# Patient Record
Sex: MALE | Race: WHITE | NOT HISPANIC OR LATINO | Employment: FULL TIME | URBAN - NONMETROPOLITAN AREA
[De-identification: names, ages, dates, MRNs, and addresses within clinical notes are randomized per-mention and may not be internally consistent; named-entity substitution may affect disease eponyms.]

---

## 2020-06-25 PROBLEM — I10 HYPERTENSION: Status: ACTIVE | Noted: 2020-06-25

## 2020-06-25 PROBLEM — F32.A DEPRESSIVE DISORDER: Status: ACTIVE | Noted: 2020-06-25

## 2020-06-25 PROBLEM — E66.9 OBESITY: Status: ACTIVE | Noted: 2020-06-25

## 2020-10-22 ENCOUNTER — HISTORICAL (OUTPATIENT)
Dept: ADMINISTRATIVE | Facility: HOSPITAL | Age: 55
End: 2020-10-22

## 2020-10-22 PROBLEM — Z87.442 HISTORY OF KIDNEY STONES: Status: ACTIVE | Noted: 2020-10-22

## 2021-05-17 PROBLEM — I10 ESSENTIAL HYPERTENSION: Chronic | Status: ACTIVE | Noted: 2020-06-25

## 2021-05-17 PROBLEM — R73.03 PREDIABETES: Status: ACTIVE | Noted: 2021-05-17

## 2021-05-17 PROBLEM — R73.03 PREDIABETES: Chronic | Status: ACTIVE | Noted: 2021-05-17

## 2021-10-04 ENCOUNTER — HISTORICAL (OUTPATIENT)
Dept: ADMINISTRATIVE | Facility: HOSPITAL | Age: 56
End: 2021-10-04

## 2022-04-10 ENCOUNTER — HISTORICAL (OUTPATIENT)
Dept: ADMINISTRATIVE | Facility: HOSPITAL | Age: 57
End: 2022-04-10

## 2022-04-24 VITALS
WEIGHT: 226.63 LBS | BODY MASS INDEX: 35.57 KG/M2 | SYSTOLIC BLOOD PRESSURE: 132 MMHG | DIASTOLIC BLOOD PRESSURE: 78 MMHG | HEIGHT: 67 IN

## 2023-07-14 ENCOUNTER — OFFICE VISIT (OUTPATIENT)
Dept: FAMILY MEDICINE | Facility: CLINIC | Age: 58
End: 2023-07-14
Payer: COMMERCIAL

## 2023-07-14 VITALS
WEIGHT: 237.19 LBS | HEART RATE: 100 BPM | BODY MASS INDEX: 37.23 KG/M2 | HEIGHT: 67 IN | SYSTOLIC BLOOD PRESSURE: 140 MMHG | OXYGEN SATURATION: 99 % | DIASTOLIC BLOOD PRESSURE: 98 MMHG | TEMPERATURE: 98 F

## 2023-07-14 DIAGNOSIS — M25.50 PAIN IN JOINT, MULTIPLE SITES: ICD-10-CM

## 2023-07-14 DIAGNOSIS — I10 ESSENTIAL HYPERTENSION: ICD-10-CM

## 2023-07-14 DIAGNOSIS — R12 HEARTBURN: ICD-10-CM

## 2023-07-14 DIAGNOSIS — F41.9 ANXIETY: ICD-10-CM

## 2023-07-14 DIAGNOSIS — R73.03 PREDIABETES: Chronic | ICD-10-CM

## 2023-07-14 DIAGNOSIS — R79.89 ELEVATED SERUM CREATININE: ICD-10-CM

## 2023-07-14 DIAGNOSIS — N18.31 STAGE 3A CHRONIC KIDNEY DISEASE: ICD-10-CM

## 2023-07-14 DIAGNOSIS — F32.A DEPRESSIVE DISORDER: ICD-10-CM

## 2023-07-14 DIAGNOSIS — Z00.01 ABNORMAL WELLNESS EXAM: Primary | ICD-10-CM

## 2023-07-14 DIAGNOSIS — E66.01 CLASS 2 SEVERE OBESITY DUE TO EXCESS CALORIES WITH SERIOUS COMORBIDITY AND BODY MASS INDEX (BMI) OF 37.0 TO 37.9 IN ADULT: ICD-10-CM

## 2023-07-14 DIAGNOSIS — Z13.31 POSITIVE DEPRESSION SCREENING: ICD-10-CM

## 2023-07-14 LAB
ALBUMIN SERPL-MCNC: 4.4 G/DL (ref 3.4–5)
ALBUMIN/GLOB SERPL: 1.8 RATIO
ALP SERPL-CCNC: 107 UNIT/L (ref 50–144)
ALT SERPL-CCNC: 64 UNIT/L (ref 1–45)
ANION GAP SERPL CALC-SCNC: 3 MEQ/L (ref 2–13)
AST SERPL-CCNC: 52 UNIT/L (ref 17–59)
BASOPHILS # BLD AUTO: 0.06 X10(3)/MCL (ref 0.01–0.08)
BASOPHILS NFR BLD AUTO: 1 % (ref 0.1–1.2)
BILIRUBIN DIRECT+TOT PNL SERPL-MCNC: 0.6 MG/DL (ref 0–1)
BUN SERPL-MCNC: 18 MG/DL (ref 7–20)
CALCIUM SERPL-MCNC: 9.7 MG/DL (ref 8.4–10.2)
CHLORIDE SERPL-SCNC: 103 MMOL/L (ref 98–110)
CHOLEST SERPL-MCNC: 239 MG/DL (ref 0–200)
CO2 SERPL-SCNC: 33 MMOL/L (ref 21–32)
CREAT SERPL-MCNC: 1.04 MG/DL (ref 0.66–1.25)
CREAT/UREA NIT SERPL: 17 (ref 12–20)
EOSINOPHIL # BLD AUTO: 0.18 X10(3)/MCL (ref 0.04–0.54)
EOSINOPHIL NFR BLD AUTO: 3 % (ref 0.7–7)
ERYTHROCYTE [DISTWIDTH] IN BLOOD BY AUTOMATED COUNT: 13 %
EST. AVERAGE GLUCOSE BLD GHB EST-MCNC: 116.9 MG/DL (ref 70–115)
GFR SERPLBLD CREATININE-BSD FMLA CKD-EPI: 84 MLS/MIN/1.73/M2
GLOBULIN SER-MCNC: 2.5 GM/DL (ref 2–3.9)
GLUCOSE SERPL-MCNC: 98 MG/DL (ref 70–115)
HBA1C MFR BLD: 5.7 % (ref 4–6)
HCT VFR BLD AUTO: 47.2 % (ref 36–52)
HDLC SERPL-MCNC: 41 MG/DL (ref 40–60)
HGB BLD-MCNC: 15.7 G/DL (ref 13–18)
IMM GRANULOCYTES # BLD AUTO: 0.01 X10(3)/MCL (ref 0–0.03)
IMM GRANULOCYTES NFR BLD AUTO: 0.2 % (ref 0–0.5)
LDLC SERPL DIRECT ASSAY-SCNC: 152.6 MG/DL (ref 30–100)
LYMPHOCYTES # BLD AUTO: 2.47 X10(3)/MCL (ref 1.32–3.57)
LYMPHOCYTES NFR BLD AUTO: 41.3 % (ref 20–55)
MCH RBC QN AUTO: 31.4 PG (ref 27–34)
MCHC RBC AUTO-ENTMCNC: 33.3 G/DL (ref 31–37)
MCV RBC AUTO: 94.4 FL (ref 79–99)
MONOCYTES # BLD AUTO: 0.8 X10(3)/MCL (ref 0.3–0.82)
MONOCYTES NFR BLD AUTO: 13.4 % (ref 4.7–12.5)
NEUTROPHILS # BLD AUTO: 2.46 X10(3)/MCL (ref 1.78–5.38)
NEUTROPHILS NFR BLD AUTO: 41.1 % (ref 37–73)
NRBC BLD AUTO-RTO: 0 %
PLATELET # BLD AUTO: 291 X10(3)/MCL (ref 140–371)
PMV BLD AUTO: 10 FL (ref 9.4–12.4)
POTASSIUM SERPL-SCNC: 4.2 MMOL/L (ref 3.5–5.1)
PROT SERPL-MCNC: 6.9 GM/DL (ref 6.3–8.2)
PSA SERPL-MCNC: 0.68 NG/ML
RBC # BLD AUTO: 5 X10(6)/MCL (ref 4–6)
SODIUM SERPL-SCNC: 139 MMOL/L (ref 135–145)
TRIGL SERPL-MCNC: 227 MG/DL (ref 30–200)
TSH SERPL-ACNC: 3.72 UIU/ML (ref 0.36–3.74)
WBC # SPEC AUTO: 5.98 X10(3)/MCL (ref 4–11.5)

## 2023-07-14 PROCEDURE — 83036 HEMOGLOBIN GLYCOSYLATED A1C: CPT | Performed by: NURSE PRACTITIONER

## 2023-07-14 PROCEDURE — 3080F DIAST BP >= 90 MM HG: CPT | Mod: CPTII,,, | Performed by: NURSE PRACTITIONER

## 2023-07-14 PROCEDURE — 99386 PREV VISIT NEW AGE 40-64: CPT | Mod: ,,, | Performed by: NURSE PRACTITIONER

## 2023-07-14 PROCEDURE — 1159F MED LIST DOCD IN RCRD: CPT | Mod: CPTII,,, | Performed by: NURSE PRACTITIONER

## 2023-07-14 PROCEDURE — 84443 ASSAY THYROID STIM HORMONE: CPT | Performed by: NURSE PRACTITIONER

## 2023-07-14 PROCEDURE — 85025 COMPLETE CBC W/AUTO DIFF WBC: CPT | Performed by: NURSE PRACTITIONER

## 2023-07-14 PROCEDURE — 1160F PR REVIEW ALL MEDS BY PRESCRIBER/CLIN PHARMACIST DOCUMENTED: ICD-10-PCS | Mod: CPTII,,, | Performed by: NURSE PRACTITIONER

## 2023-07-14 PROCEDURE — 99386 PR PREVENTIVE VISIT,NEW,40-64: ICD-10-PCS | Mod: ,,, | Performed by: NURSE PRACTITIONER

## 2023-07-14 PROCEDURE — 3008F BODY MASS INDEX DOCD: CPT | Mod: CPTII,,, | Performed by: NURSE PRACTITIONER

## 2023-07-14 PROCEDURE — 3008F PR BODY MASS INDEX (BMI) DOCUMENTED: ICD-10-PCS | Mod: CPTII,,, | Performed by: NURSE PRACTITIONER

## 2023-07-14 PROCEDURE — 84153 ASSAY OF PSA TOTAL: CPT | Performed by: NURSE PRACTITIONER

## 2023-07-14 PROCEDURE — 80061 LIPID PANEL: CPT | Performed by: NURSE PRACTITIONER

## 2023-07-14 PROCEDURE — 1159F PR MEDICATION LIST DOCUMENTED IN MEDICAL RECORD: ICD-10-PCS | Mod: CPTII,,, | Performed by: NURSE PRACTITIONER

## 2023-07-14 PROCEDURE — 1160F RVW MEDS BY RX/DR IN RCRD: CPT | Mod: CPTII,,, | Performed by: NURSE PRACTITIONER

## 2023-07-14 PROCEDURE — 3080F PR MOST RECENT DIASTOLIC BLOOD PRESSURE >= 90 MM HG: ICD-10-PCS | Mod: CPTII,,, | Performed by: NURSE PRACTITIONER

## 2023-07-14 PROCEDURE — 80053 COMPREHEN METABOLIC PANEL: CPT | Performed by: NURSE PRACTITIONER

## 2023-07-14 PROCEDURE — 3077F PR MOST RECENT SYSTOLIC BLOOD PRESSURE >= 140 MM HG: ICD-10-PCS | Mod: CPTII,,, | Performed by: NURSE PRACTITIONER

## 2023-07-14 PROCEDURE — 3077F SYST BP >= 140 MM HG: CPT | Mod: CPTII,,, | Performed by: NURSE PRACTITIONER

## 2023-07-14 RX ORDER — VENLAFAXINE HYDROCHLORIDE 37.5 MG/1
37.5 CAPSULE, EXTENDED RELEASE ORAL DAILY
Qty: 90 CAPSULE | Refills: 1 | Status: SHIPPED | OUTPATIENT
Start: 2023-07-14 | End: 2024-01-08 | Stop reason: SDUPTHER

## 2023-07-14 RX ORDER — OLMESARTAN MEDOXOMIL AND HYDROCHLOROTHIAZIDE 20/12.5 20; 12.5 MG/1; MG/1
1 TABLET ORAL DAILY
Qty: 90 TABLET | Refills: 3 | Status: SHIPPED | OUTPATIENT
Start: 2023-07-14 | End: 2023-08-10

## 2023-07-14 RX ORDER — PANTOPRAZOLE SODIUM 20 MG/1
TABLET, DELAYED RELEASE ORAL
Qty: 90 TABLET | Refills: 1 | Status: SHIPPED | OUTPATIENT
Start: 2023-07-14 | End: 2024-03-28

## 2023-07-14 RX ORDER — BUSPIRONE HYDROCHLORIDE 5 MG/1
5 TABLET ORAL 2 TIMES DAILY
Qty: 180 TABLET | Refills: 1 | Status: SHIPPED | OUTPATIENT
Start: 2023-07-14 | End: 2024-01-08

## 2023-07-14 RX ORDER — METFORMIN HYDROCHLORIDE 500 MG/1
500 TABLET, EXTENDED RELEASE ORAL DAILY
Qty: 90 TABLET | Refills: 3 | Status: SHIPPED | OUTPATIENT
Start: 2023-07-14

## 2023-07-14 NOTE — PROGRESS NOTES
I have reviewed the positive depression score which warrants active treatment with psychotherapy and/or medications. ***

## 2023-07-14 NOTE — ASSESSMENT & PLAN NOTE
Lab Results   Component Value Date    BUN 39 (H) 12/13/2022    CREATININE 1.57 (H) 12/13/2022    EGFRNORACEVR 51 (L) 12/13/2022    K 4.2 12/13/2022

## 2023-07-14 NOTE — ASSESSMENT & PLAN NOTE
Encouraged relaxation techniques such as yoga and deep breathing; daily exercise; and avoiding caffeine, alcohol and stimulants. Educated patient on the risks of serotonin based medications. Common side effects include nausea, GI upset, headache, dizziness. Educated on serotonin syndrome and on the need to call office or seek ER treatment if develops suicidal ideation and plan. Advised patient that benefits of the medication may not be noticeable for up to 6 weeks from start date. Verbal No self harm agreement made with patient. Advised to call or seek er treatment if develop suicidal plan. Patient states understanding.

## 2023-07-14 NOTE — ASSESSMENT & PLAN NOTE
This is a chronic and a comorbid condition that affected my decision making today. Patient educated on importance of diet and exercise.  Weight loss goals discussed. Recommended 30-45 minutes of exercise five days a week.  In addition, counseled patient on importance of low fat diet, limiting carbohydrate intake, and increasing protein and vegetable intake. Hand outs provided. All questions answered.

## 2023-07-14 NOTE — ASSESSMENT & PLAN NOTE
Lab Results   Component Value Date    BUN 39 (H) 12/13/2022    CREATININE 1.57 (H) 12/13/2022    EGFRNORACEVR 51 (L) 12/13/2022    K 4.2 12/13/2022     Was referred to nephrology but switched jobs, repeat labs drawn in office.

## 2023-07-14 NOTE — PROGRESS NOTES
Patient ID: Isaac Rodriguez  : 1965    Chief Complaint: Depression, Hypertension, and Chronic Kidney Disease    Allergies: Patient has No Known Allergies.     History of Present Illness:  The patient is a 57 y.o. White male who presents to clinic for annual wellness visit.    Diet and nutrition:  Diet is high in salt, high in fat, low in fiber, high caloric intake, high carbohydrate meals, high calcium intake.    Fracture risk: No history of fracture, no recent unexplained fracture.    Physical activity:  Does not exercise on a regular basis, good physical condition.    Depression risks:  + history of depression, feels sad, empty, or tearful, no sleep disturbances, no agitation,  loss of energy, no feelings of worthlessness or guilt, no thoughts of suicide.    Orientation:  No disorientation to time, no disorientation to place.    Concentration and memory:  No decreased concentration ability, no memory lapses or loss, does not forget words.    Speech forward/motor difficulties: No speech difficulties, no difficulty expressing formulated concepts, no difficulty with fine manipulative tasks, no difficulty writing forward/copying, no slowed reaction time, does not knock things over when trying to pick them up.    Fall risk assessment:  No frequent falls while walking, no fall in the past year, no dizziness forward/vertigo, no fear of falling.  Hearing:  No loss of hearing, does not wear hearing aids.    Vision:  No vision problems, does wear glasses.    Activity of daily living: Able to bathe with limited or no assistance, able to control urination and bowels, able to dress with limited or no assistance, able to feed self with limited or no assistance, able to get out of chair or bed with limited or no assistance, able to [place] with limited or no assistance, able to toilet with limited are no assistance.    Activities of daily living:  Able to do housework with limited or no assistance, able to grocery shop  with limited or no assistance, able to manage medications with limited or no assistance, able to manage money with limited or no assistance, able to prepare meals with limited or no assistance, able to use the phone with limited or no assistance.    Screenings: not due for vaccinations, not due for colorectal cancer screening.    Patient reports colonoscopy with Dr. Guerra 2 years ago.   Patient reports mood improved with venlafaxine in the past, currenlty going through separation from wife maren avendano who has cancer. Wants to restart venlafaxine. Also struggling with anxiety      Answer yes or no (+ is 3 or more)  1.   yes. I have been told that I snore.  2.   no. I have been told that I stop breathing or hold my breath when I sleep although I may have no recollection of this, witnessed apnea.    3.   yes. I am always sleepy during the day even when I slept through the night.  4.   yes. I have high blood pressure.    5.   no. I have type 2 diabetes.    6.   yes. I have been told that asleep restlessly, tossing and turning.   7.   yes. I wake up frequently to use the restroom.  8.   yes. I frequently awakens with headaches in the morning.  9.   no. I tend to fall asleep during inappropriate times.    10. yes. Others have noticed a change in personality, often grumpy or irritable.    11. yes. I am overweight or have recently gained weight.    12. no. I suddenly wake up gasping for breath at times.        Depression  Visit Type: follow-up  Patient presents with the following symptoms: depressed mood, fatigue, insomnia, irritability, muscle tension, nervousness/anxiety and thoughts of death.  Patient is not experiencing: anhedonia, chest pain, choking sensation, confusion, decreased concentration, dizziness, palpitations, panic, psychomotor agitation, psychomotor retardation, restlessness, shortness of breath, suicidal ideas, suicidal planning, weight gain and weight loss.      Hypertension  This is a chronic problem.  The current episode started more than 1 year ago. The problem has been gradually worsening since onset. The problem is uncontrolled. Associated symptoms include anxiety and malaise/fatigue. Pertinent negatives include no blurred vision, chest pain, headaches, neck pain, palpitations or shortness of breath. Risk factors for coronary artery disease include obesity and stress. Past treatments include diuretics and angiotensin blockers.      Past Medical History:  has a past medical history of Depression, HTN (hypertension), Hyperlipidemia, and Kidney stones.    Surgical History:  has a past surgical history that includes Knee surgery (Right); Shoulder surgery (Left); and Cholecystectomy.    Family History: family history includes Cancer in his father; Lung cancer in his mother.    Social History:  reports that he has never smoked. He has never used smokeless tobacco. He reports that he does not currently use alcohol. He reports that he does not use drugs.    Care Team: Patient Care Team:  DEANNE Cortez as PCP - General (Family Medicine)  Efrain Navas OD as Consulting Physician (Optometry)  Shyam Junior DPM (Podiatry)     Current Medications:  Current Outpatient Medications   Medication Instructions    atorvastatin (LIPITOR) 20 mg, Oral, Nightly    busPIRone (BUSPAR) 5 mg, Oral, 2 times daily    metFORMIN (GLUCOPHAGE-XR) 500 mg, Oral, Daily    olmesartan-hydrochlorothiazide (BENICAR HCT) 20-12.5 mg per tablet 1 tablet, Oral, Daily    pantoprazole (PROTONIX) 20 MG tablet TAKE ONE TABLET BY MOUTH EVERY MORNING EMPTY stomach    venlafaxine (EFFEXOR-XR) 37.5 mg, Oral, Daily       Review of Systems   Constitutional:  Positive for irritability and malaise/fatigue. Negative for weight gain and weight loss.   Eyes:  Negative for blurred vision.   Respiratory:  Negative for choking and shortness of breath.    Cardiovascular:  Negative for chest pain and palpitations.   Musculoskeletal:  Negative for neck pain.  "  Neurological:  Negative for headaches.   Psychiatric/Behavioral:  Positive for depression. Negative for confusion, decreased concentration and suicidal ideas. The patient is nervous/anxious and has insomnia.       Visit Vitals  BP (!) 140/98   Pulse 100   Temp 98.2 °F (36.8 °C) (Temporal)   Ht 5' 7" (1.702 m)   Wt 107.6 kg (237 lb 3.2 oz)   SpO2 99%   BMI 37.15 kg/m²       Physical Exam  Vitals and nursing note reviewed.   Constitutional:       Appearance: Normal appearance. He is obese.   HENT:      Head: Normocephalic and atraumatic.      Right Ear: Tympanic membrane, ear canal and external ear normal.      Left Ear: Tympanic membrane, ear canal and external ear normal.      Nose: Nose normal. No congestion.      Mouth/Throat:      Mouth: Mucous membranes are moist.      Pharynx: Oropharynx is clear. No oropharyngeal exudate or posterior oropharyngeal erythema.   Eyes:      Extraocular Movements: Extraocular movements intact.      Conjunctiva/sclera: Conjunctivae normal.      Pupils: Pupils are equal, round, and reactive to light.      Comments: Wearing eyeglasses   Cardiovascular:      Rate and Rhythm: Normal rate and regular rhythm.      Pulses: Normal pulses.      Heart sounds: No murmur heard.  Pulmonary:      Effort: Pulmonary effort is normal.      Breath sounds: Normal breath sounds.   Abdominal:      General: Bowel sounds are normal.      Palpations: Abdomen is soft.      Tenderness: There is no abdominal tenderness.   Musculoskeletal:         General: No swelling or tenderness. Normal range of motion.      Cervical back: Normal range of motion and neck supple.   Lymphadenopathy:      Cervical: No cervical adenopathy.   Skin:     General: Skin is warm and dry.      Capillary Refill: Capillary refill takes less than 2 seconds.   Neurological:      General: No focal deficit present.      Mental Status: He is alert and oriented to person, place, and time.   Psychiatric:         Mood and Affect: Mood normal. "         Judgment: Judgment normal.        Labs Reviewed:  Chemistry:  Lab Results   Component Value Date     12/13/2022    K 4.2 12/13/2022    BUN 39 (H) 12/13/2022    CREATININE 1.57 (H) 12/13/2022    EGFRNORACEVR 51 (L) 12/13/2022    CALCIUM 9.7 12/13/2022    ALBUMIN 4.2 11/15/2022    AST 23 11/15/2022    ALT 29 11/15/2022        Lab Results   Component Value Date    HGBA1C 6.2 (H) 11/15/2022        Hematology:  Lab Results   Component Value Date    WBC 9.1 11/15/2022    RBC 4.24 11/15/2022    HGB 13.5 11/15/2022    HCT 39.1 11/15/2022    MCV 92.2 11/15/2022    MCH 31.8 11/15/2022    MCHC 34.5 11/15/2022    RDW 14.1 11/15/2022     11/15/2022    MPV 9.6 11/15/2022    MONO 701 11/15/2022    MONO 7.7 11/15/2022    BASO 36 11/15/2022    EOSINOPHIL 1.5 11/15/2022    BASOPHIL 0.4 11/15/2022       Lipid Panel:  Lab Results   Component Value Date    CHOL 186 11/15/2022    HDL 41 11/15/2022    TRIG 234 (H) 11/15/2022        Assessment & Plan:  1. Abnormal wellness exam  Overview:  Colon Cancer Screening- request cscope from Dr. Guerra  Eye Exam- established with Dr. Navas  Dental Exam- Recommend biannually.    Orders:  -     CBC Auto Differential; Future; Expected date: 07/14/2023  -     Comprehensive Metabolic Panel; Future; Expected date: 07/14/2023  -     Lipid Panel; Future; Expected date: 07/14/2023  -     TSH; Future; Expected date: 07/14/2023  -     Hemoglobin A1C; Future; Expected date: 07/14/2023  -     PSA, Screening; Future; Expected date: 07/14/2023    2. Essential hypertension  Assessment & Plan:  Restart olmesartan/hctz and cmp drawn in office.     Orders:  -     CBC Auto Differential; Future; Expected date: 07/14/2023  -     Comprehensive Metabolic Panel; Future; Expected date: 07/14/2023  -     Lipid Panel; Future; Expected date: 07/14/2023  -     TSH; Future; Expected date: 07/14/2023  -     Hemoglobin A1C; Future; Expected date: 07/14/2023  -     PSA, Screening; Future; Expected date:  07/14/2023    3. Positive depression screening  Comments:  I have reviewed the positive depression score which warrants active treatment with psychotherapy and/or medications.    4. Depressive disorder  Overview:  Restart venlafaxine 37.5 mg daily    Assessment & Plan:  Encouraged relaxation techniques such as yoga and deep breathing; daily exercise; and avoiding caffeine, alcohol and stimulants. Educated patient on the risks of serotonin based medications. Common side effects include nausea, GI upset, headache, dizziness. Educated on serotonin syndrome and on the need to call office or seek ER treatment if develops suicidal ideation and plan. Advised patient that benefits of the medication may not be noticeable for up to 6 weeks from start date. Verbal No self harm agreement made with patient. Advised to call or seek er treatment if develop suicidal plan. Patient states understanding.         5. Prediabetes  Overview:  Restart metformin 500mg daily     Assessment & Plan:  Lab Results   Component Value Date    BUN 39 (H) 12/13/2022    CREATININE 1.57 (H) 12/13/2022    EGFRNORACEVR 51 (L) 12/13/2022    K 4.2 12/13/2022         Orders:  -     metFORMIN (GLUCOPHAGE-XR) 500 MG ER 24hr tablet; Take 1 tablet (500 mg total) by mouth once daily.  Dispense: 90 tablet; Refill: 3  -     Lipid Panel; Future; Expected date: 07/14/2023  -     Hemoglobin A1C; Future; Expected date: 07/14/2023    6. Anxiety  Overview:  begin BuSpar b.i.d.    Orders:  -     busPIRone (BUSPAR) 5 MG Tab; Take 1 tablet (5 mg total) by mouth 2 (two) times daily.  Dispense: 180 tablet; Refill: 1    7. BMI 37.0-37.9, adult  -     CBC Auto Differential; Future; Expected date: 07/14/2023  -     Comprehensive Metabolic Panel; Future; Expected date: 07/14/2023  -     Lipid Panel; Future; Expected date: 07/14/2023  -     TSH; Future; Expected date: 07/14/2023  -     Hemoglobin A1C; Future; Expected date: 07/14/2023  -     PSA, Screening; Future; Expected date:  07/14/2023    8. Elevated serum creatinine  -     Comprehensive Metabolic Panel; Future; Expected date: 07/14/2023    9. Heartburn  Assessment & Plan:  Patient educated about long-term use of PPI and possible decrease in bone mass.  Patient states understanding and wishes to continue PPI.      Orders:  -     pantoprazole (PROTONIX) 20 MG tablet; TAKE ONE TABLET BY MOUTH EVERY MORNING EMPTY stomach  Dispense: 90 tablet; Refill: 1    10. Stage 3a chronic kidney disease  Assessment & Plan:  Lab Results   Component Value Date    BUN 39 (H) 12/13/2022    CREATININE 1.57 (H) 12/13/2022    EGFRNORACEVR 51 (L) 12/13/2022    K 4.2 12/13/2022     Was referred to nephrology but switched jobs, repeat labs drawn in office.    Orders:  -     MANJIT IgG by IFA; Future; Expected date: 07/14/2023    11. Pain in joint, multiple sites    12. Class 2 severe obesity due to excess calories with serious comorbidity and body mass index (BMI) of 37.0 to 37.9 in adult  Assessment & Plan:  This is a chronic and a comorbid condition that affected my decision making today. Patient educated on importance of diet and exercise.  Weight loss goals discussed. Recommended 30-45 minutes of exercise five days a week.  In addition, counseled patient on importance of low fat diet, limiting carbohydrate intake, and increasing protein and vegetable intake. Hand outs provided. All questions answered.         Other orders  -     olmesartan-hydrochlorothiazide (BENICAR HCT) 20-12.5 mg per tablet; Take 1 tablet by mouth once daily.  Dispense: 90 tablet; Refill: 3  -     venlafaxine (EFFEXOR-XR) 37.5 MG 24 hr capsule; Take 1 capsule (37.5 mg total) by mouth once daily.  Dispense: 90 capsule; Refill: 1         Vaccinations:  Immunization History   Administered Date(s) Administered    Hepatitis B, Adult 09/22/2020, 11/06/2020    Hepatitis B, Pediatric/Adolescent 04/13/2021    Influenza 11/11/2022    Influenza - Trivalent - PF (ADULT) 10/24/2018       Future  Appointments   Date Time Provider Department Center   8/15/2023 10:30 AM DEANNE Cortez [facility]   7/9/2024  8:30 AM LAB, [facility] LABORATORY DRAW STATION [facility]   7/16/2024  2:00 PM DEANNE Cortez [facility]       Follow up for 1) 1 month f/u htn, depression, anxiety, 2) 1yr Wellness, Fasting labs prior. Call sooner if needed.    DEANNE CORTEZ    Lab Frequency Next Occurrence   Ambulatory referral/consult to Podiatry Once 08/12/2022   Ambulatory referral/consult to Nephrology Once 12/26/2022   CBC Auto Differential Once 03/19/2023   MANJIT Once 03/19/2023

## 2023-07-14 NOTE — ASSESSMENT & PLAN NOTE
Patient educated about long-term use of PPI and possible decrease in bone mass.  Patient states understanding and wishes to continue PPI.

## 2023-08-01 ENCOUNTER — TELEPHONE (OUTPATIENT)
Dept: FAMILY MEDICINE | Facility: CLINIC | Age: 58
End: 2023-08-01
Payer: COMMERCIAL

## 2023-08-01 NOTE — TELEPHONE ENCOUNTER
----- Message from Maria Almanza sent at 8/1/2023  1:09 PM CDT -----  Regarding: calll back  Pt called, said he just started back on some medicine a week ago, bp meds and cholestrol meds, said he started having some headaches, said checking blood pressure daily and it has been up and down with up and down heart rate, said he stopped taking the blood pressure pill on Saturday, so he is thinking all this is from BP meds, asking for a call back from nurse in regards to this please.          [phone]

## 2023-08-01 NOTE — TELEPHONE ENCOUNTER
Pt states that since starting new meds for his bp and cholesterol he has been experiencing headaches and dizziness. He states he on Friday his blood pressure was 140/98 and his heart rate was 100. He states that he had a headache and felt dizzy. He states so on Saturday his bp was 108/60 and his heart was 86. He states today that his blood pressure is back to being high 140/90 and his heart rate is 100. I explained to him that he needed to continue to take his medicine. He verbalized understanding and to keep his follow up apt.

## 2023-08-10 ENCOUNTER — OFFICE VISIT (OUTPATIENT)
Dept: FAMILY MEDICINE | Facility: CLINIC | Age: 58
End: 2023-08-10
Payer: COMMERCIAL

## 2023-08-10 VITALS
HEART RATE: 106 BPM | TEMPERATURE: 97 F | SYSTOLIC BLOOD PRESSURE: 118 MMHG | HEIGHT: 67 IN | DIASTOLIC BLOOD PRESSURE: 70 MMHG | OXYGEN SATURATION: 99 % | BODY MASS INDEX: 36.88 KG/M2 | WEIGHT: 235 LBS

## 2023-08-10 DIAGNOSIS — R73.03 PREDIABETES: Chronic | ICD-10-CM

## 2023-08-10 DIAGNOSIS — F41.9 ANXIETY: ICD-10-CM

## 2023-08-10 DIAGNOSIS — I10 ESSENTIAL HYPERTENSION: Primary | Chronic | ICD-10-CM

## 2023-08-10 DIAGNOSIS — E78.2 MIXED HYPERLIPIDEMIA: ICD-10-CM

## 2023-08-10 DIAGNOSIS — N18.2 CKD (CHRONIC KIDNEY DISEASE) STAGE 2, GFR 60-89 ML/MIN: ICD-10-CM

## 2023-08-10 DIAGNOSIS — R00.2 PALPITATIONS: ICD-10-CM

## 2023-08-10 PROBLEM — N18.31 STAGE 3A CHRONIC KIDNEY DISEASE: Status: RESOLVED | Noted: 2023-07-14 | Resolved: 2023-08-10

## 2023-08-10 PROCEDURE — 4010F ACE/ARB THERAPY RXD/TAKEN: CPT | Mod: CPTII,,, | Performed by: NURSE PRACTITIONER

## 2023-08-10 PROCEDURE — 1159F MED LIST DOCD IN RCRD: CPT | Mod: CPTII,,, | Performed by: NURSE PRACTITIONER

## 2023-08-10 PROCEDURE — 99214 OFFICE O/P EST MOD 30 MIN: CPT | Mod: ,,, | Performed by: NURSE PRACTITIONER

## 2023-08-10 PROCEDURE — 99214 PR OFFICE/OUTPT VISIT, EST, LEVL IV, 30-39 MIN: ICD-10-PCS | Mod: ,,, | Performed by: NURSE PRACTITIONER

## 2023-08-10 PROCEDURE — 1160F PR REVIEW ALL MEDS BY PRESCRIBER/CLIN PHARMACIST DOCUMENTED: ICD-10-PCS | Mod: CPTII,,, | Performed by: NURSE PRACTITIONER

## 2023-08-10 PROCEDURE — 1159F PR MEDICATION LIST DOCUMENTED IN MEDICAL RECORD: ICD-10-PCS | Mod: CPTII,,, | Performed by: NURSE PRACTITIONER

## 2023-08-10 PROCEDURE — 3044F HG A1C LEVEL LT 7.0%: CPT | Mod: CPTII,,, | Performed by: NURSE PRACTITIONER

## 2023-08-10 PROCEDURE — 4010F PR ACE/ARB THEARPY RXD/TAKEN: ICD-10-PCS | Mod: CPTII,,, | Performed by: NURSE PRACTITIONER

## 2023-08-10 PROCEDURE — 3008F PR BODY MASS INDEX (BMI) DOCUMENTED: ICD-10-PCS | Mod: CPTII,,, | Performed by: NURSE PRACTITIONER

## 2023-08-10 PROCEDURE — 3074F PR MOST RECENT SYSTOLIC BLOOD PRESSURE < 130 MM HG: ICD-10-PCS | Mod: CPTII,,, | Performed by: NURSE PRACTITIONER

## 2023-08-10 PROCEDURE — 3044F PR MOST RECENT HEMOGLOBIN A1C LEVEL <7.0%: ICD-10-PCS | Mod: CPTII,,, | Performed by: NURSE PRACTITIONER

## 2023-08-10 PROCEDURE — 3078F PR MOST RECENT DIASTOLIC BLOOD PRESSURE < 80 MM HG: ICD-10-PCS | Mod: CPTII,,, | Performed by: NURSE PRACTITIONER

## 2023-08-10 PROCEDURE — 3008F BODY MASS INDEX DOCD: CPT | Mod: CPTII,,, | Performed by: NURSE PRACTITIONER

## 2023-08-10 PROCEDURE — 3078F DIAST BP <80 MM HG: CPT | Mod: CPTII,,, | Performed by: NURSE PRACTITIONER

## 2023-08-10 PROCEDURE — 3074F SYST BP LT 130 MM HG: CPT | Mod: CPTII,,, | Performed by: NURSE PRACTITIONER

## 2023-08-10 PROCEDURE — 1160F RVW MEDS BY RX/DR IN RCRD: CPT | Mod: CPTII,,, | Performed by: NURSE PRACTITIONER

## 2023-08-10 RX ORDER — OLMESARTAN MEDOXOMIL 20 MG/1
20 TABLET ORAL DAILY
Qty: 90 TABLET | Refills: 3 | Status: SHIPPED | OUTPATIENT
Start: 2023-08-10 | End: 2024-08-09

## 2023-08-10 NOTE — PROGRESS NOTES
"Patient ID: Isaac Rodriguez  : 1965     Chief Complaint: Blood pressure isues (Blood pressure issues)    Allergies: Patient has No Known Allergies.     History of Present Illness:  The patient is a 57 y.o. White male who presents to clinic for evaluation and management with a chief complaint of Blood pressure isues (Blood pressure issues)   Patient reports bps are dropping low with symptoms of dizziness, fatigue, weakness, palpitations. Stopped BP medication for 4 days but then BP rick too high. Works indoors at [employer] but does sweat a lot. Patient denies any chest pains.          Past Medical History:  has a past medical history of Depression, HTN (hypertension), Hyperlipidemia, and Kidney stones.    Social History:  reports that he has never smoked. He has never used smokeless tobacco. He reports that he does not currently use alcohol. He reports that he does not use drugs.    Care Team: Patient Care Team:  Enoc Lezama APRN as PCP - General (Family Medicine)  Efrain Navas OD as Consulting Physician (Optometry)  Shyam Junior DPM (Podiatry)     Current Medications:  Current Outpatient Medications   Medication Instructions    atorvastatin (LIPITOR) 20 mg, Oral, Nightly    busPIRone (BUSPAR) 5 mg, Oral, 2 times daily    metFORMIN (GLUCOPHAGE-XR) 500 mg, Oral, Daily    olmesartan (BENICAR) 20 mg, Oral, Daily    pantoprazole (PROTONIX) 20 MG tablet TAKE ONE TABLET BY MOUTH EVERY MORNING EMPTY stomach    venlafaxine (EFFEXOR-XR) 37.5 mg, Oral, Daily       Review of Systems     Visit Vitals  /70 (BP Location: Right arm)   Pulse 106   Temp 97 °F (36.1 °C) (Temporal)   Ht 5' 7" (1.702 m)   Wt 106.6 kg (235 lb)   SpO2 99%   BMI 36.81 kg/m²       Physical Exam     Labs Reviewed:  Chemistry:  Lab Results   Component Value Date     2023    K 4.2 2023    CHLORIDE 103 2023    BUN 18.0 2023    CREATININE 1.04 2023    EGFRNORACEVR 84 2023    GLUCOSE 98 " 07/14/2023    CALCIUM 9.7 07/14/2023    ALKPHOS 107 07/14/2023    LABPROT 6.9 07/14/2023    ALBUMIN 4.4 07/14/2023    AST 52 07/14/2023    ALT 64 (H) 07/14/2023    TSH 3.720 07/14/2023    PSA 0.68 07/14/2023        Lab Results   Component Value Date    HGBA1C 5.7 07/14/2023        Hematology:  Lab Results   Component Value Date    WBC 5.98 07/14/2023    RBC 5.00 07/14/2023    HGB 15.7 07/14/2023    HCT 47.2 07/14/2023    MCV 94.4 07/14/2023    MCH 31.4 07/14/2023    MCHC 33.3 07/14/2023    RDW 13.0 07/14/2023     07/14/2023    MPV 10.0 07/14/2023    MONO 701 11/15/2022    MONO 7.7 11/15/2022    BASO 36 11/15/2022    EOSINOPHIL 1.5 11/15/2022    BASOPHIL 0.4 11/15/2022       Lipid Panel:  Lab Results   Component Value Date    CHOL 239 (H) 07/14/2023    HDL 41 07/14/2023    DLDL 152.6 (H) 07/14/2023    LDLCALC 110 (H) 11/15/2022    TRIG 227 (H) 07/14/2023        Assessment & Plan:  1. Essential hypertension  Overview:  Stop hctz, continue olmesartan    Orders:  -     olmesartan (BENICAR) 20 MG tablet; Take 1 tablet (20 mg total) by mouth once daily.  Dispense: 90 tablet; Refill: 3  -     Comprehensive Metabolic Panel; Future; Expected date: 01/10/2024    2. CKD (chronic kidney disease) stage 2, GFR 60-89 ml/min  Overview:  Improved from stage 3a    Assessment & Plan:  Lab Results   Component Value Date    BUN 18.0 07/14/2023    CREATININE 1.04 07/14/2023    EGFRNORACEVR 84 07/14/2023    K 4.2 07/14/2023         Orders:  -     Comprehensive Metabolic Panel; Future; Expected date: 01/10/2024    3. Prediabetes  Overview:  Taking metformin 500mg daily     Assessment & Plan:  Diabetes labs:   Lab Results   Component Value Date    HGBA1C 5.7 07/14/2023          Orders:  -     Hemoglobin A1C; Future; Expected date: 01/10/2024    4. Anxiety  Overview:  Stop buspar, continue effexor      5. Palpitations  Comments:  EKG NSR, if continues despite med adjustments will consult cardiology  Orders:  -     POCT EKG 12-LEAD  (Manually Resulted by Ordering Provider)    6. Mixed hyperlipidemia  -     Comprehensive Metabolic Panel; Future; Expected date: 01/10/2024  -     Lipid Panel; Future; Expected date: 01/10/2024         Future Appointments   Date Time Provider Department Center   8/15/2023 10:30 AM Enoc Lezama APRN [facility]   7/9/2024  8:30 AM LAB, [facility] LABORATORY DRAW STATION [facility]   7/16/2024  2:00 PM [facility]       Follow up in about 3 weeks (around 8/31/2023) for f/u htn, anxiety, 2) 5 mo f/u cholesterol, htn, fasting labs prior. Call sooner if needed.    DEANNE RODRÍGUEZ    Lab Frequency Next Occurrence   Ambulatory referral/consult to Podiatry Once 08/12/2022   Ambulatory referral/consult to Nephrology Once 12/26/2022   CBC Auto Differential Once 03/19/2023   MANJIT Once 03/19/2023

## 2023-08-10 NOTE — ASSESSMENT & PLAN NOTE
Lab Results   Component Value Date    BUN 18.0 07/14/2023    CREATININE 1.04 07/14/2023    EGFRNORACEVR 84 07/14/2023    K 4.2 07/14/2023

## 2023-10-16 PROBLEM — Z00.01 ABNORMAL WELLNESS EXAM: Status: RESOLVED | Noted: 2023-07-14 | Resolved: 2023-10-16

## 2023-12-27 LAB
LEFT EYE DM RETINOPATHY: POSITIVE
RIGHT EYE DM RETINOPATHY: POSITIVE

## 2023-12-28 ENCOUNTER — TELEPHONE (OUTPATIENT)
Dept: FAMILY MEDICINE | Facility: CLINIC | Age: 58
End: 2023-12-28
Payer: COMMERCIAL

## 2023-12-28 ENCOUNTER — DOCUMENTATION ONLY (OUTPATIENT)
Dept: ADMINISTRATIVE | Facility: HOSPITAL | Age: 58
End: 2023-12-28
Payer: COMMERCIAL

## 2023-12-28 NOTE — TELEPHONE ENCOUNTER
----- Message from DEANNE Cortez sent at 12/28/2023  8:55 AM CST -----  Please notify patient of following results:     Positive retinopathy on vision screen. Ensure patient is following up with his eye doctor annually

## 2024-01-03 ENCOUNTER — TELEPHONE (OUTPATIENT)
Dept: FAMILY MEDICINE | Facility: CLINIC | Age: 59
End: 2024-01-03
Payer: COMMERCIAL

## 2024-01-08 ENCOUNTER — HOSPITAL ENCOUNTER (OUTPATIENT)
Dept: RADIOLOGY | Facility: HOSPITAL | Age: 59
Discharge: HOME | End: 2024-01-08
Attending: NURSE PRACTITIONER
Payer: COMMERCIAL

## 2024-01-08 ENCOUNTER — PATIENT MESSAGE (OUTPATIENT)
Dept: FAMILY MEDICINE | Facility: CLINIC | Age: 59
End: 2024-01-08

## 2024-01-08 ENCOUNTER — OFFICE VISIT (OUTPATIENT)
Dept: FAMILY MEDICINE | Facility: CLINIC | Age: 59
End: 2024-01-08
Payer: COMMERCIAL

## 2024-01-08 VITALS
HEART RATE: 86 BPM | BODY MASS INDEX: 38.33 KG/M2 | WEIGHT: 244.19 LBS | DIASTOLIC BLOOD PRESSURE: 84 MMHG | SYSTOLIC BLOOD PRESSURE: 126 MMHG | OXYGEN SATURATION: 97 % | HEIGHT: 67 IN | TEMPERATURE: 98 F

## 2024-01-08 DIAGNOSIS — F32.A DEPRESSIVE DISORDER: ICD-10-CM

## 2024-01-08 DIAGNOSIS — M25.552 LEFT HIP PAIN: ICD-10-CM

## 2024-01-08 DIAGNOSIS — M72.2 PLANTAR FASCIITIS: ICD-10-CM

## 2024-01-08 DIAGNOSIS — Z13.31 POSITIVE DEPRESSION SCREENING: ICD-10-CM

## 2024-01-08 DIAGNOSIS — M54.42 ACUTE LEFT-SIDED LOW BACK PAIN WITH LEFT-SIDED SCIATICA: Primary | ICD-10-CM

## 2024-01-08 PROCEDURE — 3008F BODY MASS INDEX DOCD: CPT | Mod: CPTII,,, | Performed by: NURSE PRACTITIONER

## 2024-01-08 PROCEDURE — 73502 X-RAY EXAM HIP UNI 2-3 VIEWS: CPT | Mod: TC,LT

## 2024-01-08 PROCEDURE — 3079F DIAST BP 80-89 MM HG: CPT | Mod: CPTII,,, | Performed by: NURSE PRACTITIONER

## 2024-01-08 PROCEDURE — 3074F SYST BP LT 130 MM HG: CPT | Mod: CPTII,,, | Performed by: NURSE PRACTITIONER

## 2024-01-08 PROCEDURE — 96372 THER/PROPH/DIAG INJ SC/IM: CPT | Mod: ,,, | Performed by: NURSE PRACTITIONER

## 2024-01-08 PROCEDURE — 1159F MED LIST DOCD IN RCRD: CPT | Mod: CPTII,,, | Performed by: NURSE PRACTITIONER

## 2024-01-08 PROCEDURE — 72100 X-RAY EXAM L-S SPINE 2/3 VWS: CPT | Mod: TC

## 2024-01-08 PROCEDURE — 1160F RVW MEDS BY RX/DR IN RCRD: CPT | Mod: CPTII,,, | Performed by: NURSE PRACTITIONER

## 2024-01-08 PROCEDURE — 99214 OFFICE O/P EST MOD 30 MIN: CPT | Mod: 25,,, | Performed by: NURSE PRACTITIONER

## 2024-01-08 RX ORDER — NAPROXEN 500 MG/1
500 TABLET ORAL 2 TIMES DAILY WITH MEALS
Qty: 60 TABLET | Refills: 1 | Status: SHIPPED | OUTPATIENT
Start: 2024-01-08

## 2024-01-08 RX ORDER — KETOROLAC TROMETHAMINE 30 MG/ML
60 INJECTION, SOLUTION INTRAMUSCULAR; INTRAVENOUS
Status: COMPLETED | OUTPATIENT
Start: 2024-01-08 | End: 2024-01-08

## 2024-01-08 RX ORDER — DICLOFENAC SODIUM 10 MG/G
2 GEL TOPICAL 4 TIMES DAILY
Qty: 100 G | Refills: 0 | Status: SHIPPED | OUTPATIENT
Start: 2024-01-08 | End: 2024-02-15

## 2024-01-08 RX ORDER — TIZANIDINE 4 MG/1
4 TABLET ORAL EVERY 8 HOURS PRN
Qty: 20 TABLET | Refills: 0 | Status: SHIPPED | OUTPATIENT
Start: 2024-01-08 | End: 2024-01-23

## 2024-01-08 RX ORDER — VENLAFAXINE HYDROCHLORIDE 75 MG/1
75 CAPSULE, EXTENDED RELEASE ORAL DAILY
Qty: 90 CAPSULE | Refills: 1 | Status: SHIPPED | OUTPATIENT
Start: 2024-01-08 | End: 2024-01-23 | Stop reason: ALTCHOICE

## 2024-01-08 RX ADMIN — KETOROLAC TROMETHAMINE 60 MG: 30 INJECTION, SOLUTION INTRAMUSCULAR; INTRAVENOUS at 10:01

## 2024-01-09 ENCOUNTER — TELEPHONE (OUTPATIENT)
Dept: FAMILY MEDICINE | Facility: CLINIC | Age: 59
End: 2024-01-09
Payer: COMMERCIAL

## 2024-01-09 ENCOUNTER — PATIENT MESSAGE (OUTPATIENT)
Dept: FAMILY MEDICINE | Facility: CLINIC | Age: 59
End: 2024-01-09
Payer: COMMERCIAL

## 2024-01-09 NOTE — TELEPHONE ENCOUNTER
----- Message from DEANNE Cortez sent at 1/9/2024 11:47 AM CST -----  Please notify patient of following results:   Xrays show arthritic changes in hip and back. If patient would like, ok to refer to  physical therapy.

## 2024-01-23 ENCOUNTER — OFFICE VISIT (OUTPATIENT)
Dept: FAMILY MEDICINE | Facility: CLINIC | Age: 59
End: 2024-01-23
Payer: COMMERCIAL

## 2024-01-23 VITALS
OXYGEN SATURATION: 98 % | BODY MASS INDEX: 38.01 KG/M2 | HEART RATE: 86 BPM | TEMPERATURE: 97 F | HEIGHT: 67 IN | WEIGHT: 242.19 LBS | DIASTOLIC BLOOD PRESSURE: 72 MMHG | SYSTOLIC BLOOD PRESSURE: 124 MMHG

## 2024-01-23 DIAGNOSIS — Z23 IMMUNIZATION DUE: ICD-10-CM

## 2024-01-23 DIAGNOSIS — I10 ESSENTIAL HYPERTENSION: Primary | Chronic | ICD-10-CM

## 2024-01-23 DIAGNOSIS — R73.03 PREDIABETES: Chronic | ICD-10-CM

## 2024-01-23 DIAGNOSIS — M15.9 PRIMARY OSTEOARTHRITIS INVOLVING MULTIPLE JOINTS: ICD-10-CM

## 2024-01-23 DIAGNOSIS — F32.A DEPRESSIVE DISORDER: ICD-10-CM

## 2024-01-23 DIAGNOSIS — Z00.01 ABNORMAL WELLNESS EXAM: ICD-10-CM

## 2024-01-23 DIAGNOSIS — N18.2 CKD (CHRONIC KIDNEY DISEASE) STAGE 2, GFR 60-89 ML/MIN: ICD-10-CM

## 2024-01-23 DIAGNOSIS — F41.9 ANXIETY: ICD-10-CM

## 2024-01-23 DIAGNOSIS — Z13.31 POSITIVE DEPRESSION SCREENING: ICD-10-CM

## 2024-01-23 DIAGNOSIS — E78.49 OTHER HYPERLIPIDEMIA: ICD-10-CM

## 2024-01-23 PROBLEM — M54.42 ACUTE LEFT-SIDED LOW BACK PAIN WITH LEFT-SIDED SCIATICA: Status: RESOLVED | Noted: 2024-01-08 | Resolved: 2024-01-23

## 2024-01-23 PROBLEM — M25.552 LEFT HIP PAIN: Status: RESOLVED | Noted: 2024-01-08 | Resolved: 2024-01-23

## 2024-01-23 PROBLEM — M15.0 PRIMARY OSTEOARTHRITIS INVOLVING MULTIPLE JOINTS: Status: ACTIVE | Noted: 2024-01-23

## 2024-01-23 PROCEDURE — 99214 OFFICE O/P EST MOD 30 MIN: CPT | Mod: ,,, | Performed by: NURSE PRACTITIONER

## 2024-01-23 PROCEDURE — 1160F RVW MEDS BY RX/DR IN RCRD: CPT | Mod: CPTII,,, | Performed by: NURSE PRACTITIONER

## 2024-01-23 PROCEDURE — G0008 ADMIN INFLUENZA VIRUS VAC: HCPCS | Mod: ,,, | Performed by: NURSE PRACTITIONER

## 2024-01-23 PROCEDURE — 90686 IIV4 VACC NO PRSV 0.5 ML IM: CPT | Mod: ,,, | Performed by: NURSE PRACTITIONER

## 2024-01-23 PROCEDURE — 3008F BODY MASS INDEX DOCD: CPT | Mod: CPTII,,, | Performed by: NURSE PRACTITIONER

## 2024-01-23 PROCEDURE — 3074F SYST BP LT 130 MM HG: CPT | Mod: CPTII,,, | Performed by: NURSE PRACTITIONER

## 2024-01-23 PROCEDURE — 3078F DIAST BP <80 MM HG: CPT | Mod: CPTII,,, | Performed by: NURSE PRACTITIONER

## 2024-01-23 PROCEDURE — 1159F MED LIST DOCD IN RCRD: CPT | Mod: CPTII,,, | Performed by: NURSE PRACTITIONER

## 2024-01-23 RX ORDER — DULOXETIN HYDROCHLORIDE 60 MG/1
60 CAPSULE, DELAYED RELEASE ORAL DAILY
Qty: 90 CAPSULE | Refills: 1 | Status: SHIPPED | OUTPATIENT
Start: 2024-01-23 | End: 2024-01-23 | Stop reason: SDUPTHER

## 2024-01-23 RX ORDER — DULOXETIN HYDROCHLORIDE 30 MG/1
CAPSULE, DELAYED RELEASE ORAL
Qty: 60 CAPSULE | Refills: 0 | Status: SHIPPED | OUTPATIENT
Start: 2024-01-23 | End: 2024-02-19

## 2024-01-23 RX ORDER — ATORVASTATIN CALCIUM 20 MG/1
20 TABLET, FILM COATED ORAL NIGHTLY
Qty: 90 TABLET | Refills: 3 | Status: SHIPPED | OUTPATIENT
Start: 2024-01-23

## 2024-01-23 NOTE — PROGRESS NOTES
Patient ID: Isaac Rodriguez  : 1965     Chief Complaint: Hypertension, Anxiety, and Diabetes    Allergies: Patient has No Known Allergies.     History of Present Illness:  The patient is a 58 y.o. White male who presents to clinic for evaluation and management with a chief complaint of Hypertension, Anxiety, and Diabetes   Patient reports still struggling with some depression and anxiety. Also generalized aches and pains are bothersome with weather changes which is also affecting his mood. Tries to avoid nsaids with hx of CKD.     Hypertension  Associated symptoms include anxiety. Pertinent negatives include no blurred vision, chest pain, headaches, PND or shortness of breath. Past treatments include angiotensin blockers. The current treatment provides significant improvement. Compliance problems include exercise.  Identifiable causes of hypertension include chronic renal disease.   Anxiety  Presents for follow-up visit. Symptoms include depressed mood, excessive worry, nervous/anxious behavior and restlessness. Patient reports no chest pain, confusion, dizziness or shortness of breath. The severity of symptoms is interfering with daily activities.     Compliance with medications is %.   Diabetes  Diabetes type: prediabetes. His disease course has been stable. Hypoglycemia symptoms include nervousness/anxiousness. Pertinent negatives for hypoglycemia include no confusion, dizziness, headaches, mood changes or seizures. Pertinent negatives for diabetes include no blurred vision and no chest pain. There are no hypoglycemic complications. There are no diabetic complications. Risk factors for coronary artery disease include family history, obesity, male sex, hypertension and sedentary lifestyle. Current diabetic treatment includes oral agent (monotherapy). An ACE inhibitor/angiotensin II receptor blocker is being taken.        Past Medical History:  has a past medical history of Depression, HTN  "(hypertension), Hyperlipidemia, and Kidney stones.    Social History:  reports that he has never smoked. He has never used smokeless tobacco. He reports that he does not currently use alcohol. He reports that he does not use drugs.    Care Team: Patient Care Team:  Enoc Lezama APRN as PCP - General (Family Medicine)  Efrain Navas OD as Consulting Physician (Optometry)  Shyam Junior DPM (Podiatry)     Current Medications:  Current Outpatient Medications   Medication Instructions    atorvastatin (LIPITOR) 20 mg, Oral, Nightly    diclofenac sodium (VOLTAREN) 2 g, Topical (Top), 4 times daily    DULoxetine (CYMBALTA) 60 mg, Oral, Daily    metFORMIN (GLUCOPHAGE-XR) 500 mg, Oral, Daily    naproxen (NAPROSYN) 500 mg, Oral, 2 times daily with meals    olmesartan (BENICAR) 20 mg, Oral, Daily    pantoprazole (PROTONIX) 20 MG tablet TAKE ONE TABLET BY MOUTH EVERY MORNING EMPTY stomach       Review of Systems   Eyes:  Negative for blurred vision.   Respiratory:  Negative for shortness of breath.    Cardiovascular:  Negative for chest pain and PND.   Neurological:  Negative for dizziness, seizures and headaches.   Psychiatric/Behavioral:  Negative for confusion. The patient is nervous/anxious.         Visit Vitals  /72 (BP Location: Right arm, Patient Position: Sitting, BP Method: Medium (Manual))   Pulse 86   Temp 97.2 °F (36.2 °C) (Temporal)   Ht 5' 7.01" (1.702 m)   Wt 109.9 kg (242 lb 3.2 oz)   SpO2 98%   BMI 37.92 kg/m²       Physical Exam  Vitals and nursing note reviewed.   Constitutional:       Appearance: Normal appearance. He is obese.   HENT:      Head: Normocephalic and atraumatic.      Nose: Nose normal. No congestion.      Mouth/Throat:      Mouth: Mucous membranes are moist.      Pharynx: Oropharynx is clear. No oropharyngeal exudate or posterior oropharyngeal erythema.   Eyes:      Conjunctiva/sclera: Conjunctivae normal.      Comments: Wearing eyeglasses   Cardiovascular:      Rate and " Rhythm: Normal rate and regular rhythm.      Pulses: Normal pulses.      Heart sounds: No murmur heard.  Pulmonary:      Effort: Pulmonary effort is normal.      Breath sounds: Normal breath sounds.   Musculoskeletal:         General: No swelling or tenderness. Normal range of motion.   Skin:     General: Skin is warm and dry.   Neurological:      General: No focal deficit present.      Mental Status: He is alert and oriented to person, place, and time.   Psychiatric:         Mood and Affect: Mood normal.         Judgment: Judgment normal.        Labs Reviewed:  Chemistry:  Lab Results   Component Value Date     07/14/2023    K 4.2 07/14/2023    CHLORIDE 103 07/14/2023    BUN 18.0 07/14/2023    CREATININE 1.04 07/14/2023    EGFRNORACEVR 84 07/14/2023    GLUCOSE 98 07/14/2023    CALCIUM 9.7 07/14/2023    ALKPHOS 107 07/14/2023    LABPROT 6.9 07/14/2023    ALBUMIN 4.4 07/14/2023    AST 52 07/14/2023    ALT 64 (H) 07/14/2023    TSH 3.720 07/14/2023    PSA 0.68 07/14/2023        Lab Results   Component Value Date    HGBA1C 5.7 07/14/2023        Hematology:  Lab Results   Component Value Date    WBC 5.98 07/14/2023    RBC 5.00 07/14/2023    HGB 15.7 07/14/2023    HCT 47.2 07/14/2023    MCV 94.4 07/14/2023    MCH 31.4 07/14/2023    MCHC 33.3 07/14/2023    RDW 13.0 07/14/2023     07/14/2023    MPV 10.0 07/14/2023    MONO 701 11/15/2022    MONO 7.7 11/15/2022    BASO 36 11/15/2022    EOSINOPHIL 1.5 11/15/2022    BASOPHIL 0.4 11/15/2022       Lipid Panel:  Lab Results   Component Value Date    CHOL 239 (H) 07/14/2023    HDL 41 07/14/2023    DLDL 152.6 (H) 07/14/2023    LDLCALC 110 (H) 11/15/2022    TRIG 227 (H) 07/14/2023        Assessment & Plan:  1. Essential hypertension  Overview:  Bp stable with olmesartan    Assessment & Plan:  Lab Results   Component Value Date    BUN 18.0 07/14/2023    CREATININE 1.04 07/14/2023    EGFRNORACEVR 84 07/14/2023    K 4.2 07/14/2023         Orders:  -     CBC Auto  Differential; Future; Expected date: 07/23/2024  -     Comprehensive Metabolic Panel; Future; Expected date: 07/23/2024  -     Lipid Panel; Future; Expected date: 07/23/2024  -     TSH; Future; Expected date: 07/23/2024  -     Hemoglobin A1C; Future; Expected date: 07/23/2024    2. Depressive disorder  Overview:  Change effexor to cymbalta,     Assessment & Plan:  begin with 30mg daily for 5 days then increase to 60mg. The risks and benefits of medications were discussed with the patient. All questions answered.       Orders:  -     Discontinue: DULoxetine (CYMBALTA) 60 MG capsule; Take 1 capsule (60 mg total) by mouth once daily.  Dispense: 90 capsule; Refill: 1  -     DULoxetine (CYMBALTA) 30 MG capsule; Take 1 capsule (30 mg total) by mouth once daily for 5 days, THEN 2 capsules (60 mg total) once daily.  Dispense: 60 capsule; Refill: 0    3. Prediabetes  Overview:  Stable with metformin 500mg daily     Assessment & Plan:  1/9/24 hba1c 5.8, creat 1.1    Orders:  -     Hemoglobin A1C; Future; Expected date: 07/23/2024    4. Anxiety  Overview:  Taking effexor 75mg daily    Orders:  -     Discontinue: DULoxetine (CYMBALTA) 60 MG capsule; Take 1 capsule (60 mg total) by mouth once daily.  Dispense: 90 capsule; Refill: 1  -     DULoxetine (CYMBALTA) 30 MG capsule; Take 1 capsule (30 mg total) by mouth once daily for 5 days, THEN 2 capsules (60 mg total) once daily.  Dispense: 60 capsule; Refill: 0    5. Other hyperlipidemia  Overview:  Improved with atorvastatin 20    Assessment & Plan:  1/8/24 chol 147, trig 190, hdl 34, ldl 81, lft wnl    Orders:  -     atorvastatin (LIPITOR) 20 MG tablet; Take 1 tablet (20 mg total) by mouth nightly.  Dispense: 90 tablet; Refill: 3  -     Comprehensive Metabolic Panel; Future; Expected date: 07/23/2024  -     Lipid Panel; Future; Expected date: 07/23/2024    6. Positive depression screening  Comments:  I have reviewed the positive depression score which warrants active treatment  with psychotherapy and/or medications.    7. CKD (chronic kidney disease) stage 2, GFR 60-89 ml/min  Overview:  Improved from stage 3a    Assessment & Plan:  1/9/24 creat 1.1, egfr 74    Orders:  -     CBC Auto Differential; Future; Expected date: 07/23/2024  -     Comprehensive Metabolic Panel; Future; Expected date: 07/23/2024    8. Primary osteoarthritis involving multiple joints  Overview:  Ok to continue nsaid PRN only      9. Immunization due  -     Influenza - Quadrivalent *Preferred* (6 months+) (PF)         Future Appointments   Date Time Provider Department Center   7/9/2024  8:30 AM LAB, [facility] [place] LABORATORY DRAW STATION [facility] [place] [facility]   10/17/2024  9:00 AM Enoc Lezama APRN [facility]       Follow up for 1 month f/u, Depression/Anxiety, Virtual Visit. Call sooner if needed.    DEANNE RODRÍGUEZ    Lab Frequency Next Occurrence   MANJIT Once 03/19/2023          I have used clinical judgement based on duration and functional status to consider definite necessity for treatment.

## 2024-01-23 NOTE — ASSESSMENT & PLAN NOTE
begin with 30mg daily for 5 days then increase to 60mg. The risks and benefits of medications were discussed with the patient. All questions answered.

## 2024-02-08 ENCOUNTER — PATIENT MESSAGE (OUTPATIENT)
Dept: FAMILY MEDICINE | Facility: CLINIC | Age: 59
End: 2024-02-08
Payer: COMMERCIAL

## 2024-02-15 ENCOUNTER — OFFICE VISIT (OUTPATIENT)
Dept: FAMILY MEDICINE | Facility: CLINIC | Age: 59
End: 2024-02-15
Payer: COMMERCIAL

## 2024-02-15 VITALS
HEART RATE: 111 BPM | BODY MASS INDEX: 37.04 KG/M2 | HEIGHT: 67 IN | TEMPERATURE: 98 F | WEIGHT: 236 LBS | SYSTOLIC BLOOD PRESSURE: 90 MMHG | DIASTOLIC BLOOD PRESSURE: 68 MMHG | OXYGEN SATURATION: 95 %

## 2024-02-15 DIAGNOSIS — F41.9 ANXIETY: ICD-10-CM

## 2024-02-15 DIAGNOSIS — R25.2 LEG CRAMPS: ICD-10-CM

## 2024-02-15 DIAGNOSIS — R42 DIZZINESS: ICD-10-CM

## 2024-02-15 DIAGNOSIS — H72.92 RUPTURED EAR DRUM, LEFT: Primary | ICD-10-CM

## 2024-02-15 DIAGNOSIS — F32.A DEPRESSIVE DISORDER: ICD-10-CM

## 2024-02-15 DIAGNOSIS — R53.83 OTHER FATIGUE: ICD-10-CM

## 2024-02-15 DIAGNOSIS — I95.1 ORTHOSTATIC HYPOTENSION: ICD-10-CM

## 2024-02-15 LAB
ALBUMIN SERPL-MCNC: 4.5 G/DL (ref 3.4–5)
ALBUMIN/GLOB SERPL: 1.6 RATIO
ALP SERPL-CCNC: 97 UNIT/L (ref 50–144)
ALT SERPL-CCNC: 40 UNIT/L (ref 1–45)
ANION GAP SERPL CALC-SCNC: 7 MEQ/L (ref 2–13)
AST SERPL-CCNC: 34 UNIT/L (ref 17–59)
BASOPHILS # BLD AUTO: 0.04 X10(3)/MCL (ref 0.01–0.08)
BASOPHILS NFR BLD AUTO: 0.5 % (ref 0.1–1.2)
BILIRUB SERPL-MCNC: 0.7 MG/DL (ref 0–1)
BUN SERPL-MCNC: 28 MG/DL (ref 7–20)
CALCIUM SERPL-MCNC: 9.8 MG/DL (ref 8.4–10.2)
CHLORIDE SERPL-SCNC: 104 MMOL/L (ref 98–110)
CO2 SERPL-SCNC: 27 MMOL/L (ref 21–32)
CREAT SERPL-MCNC: 1.22 MG/DL (ref 0.66–1.25)
CREAT/UREA NIT SERPL: 23 (ref 12–20)
EOSINOPHIL # BLD AUTO: 0.17 X10(3)/MCL (ref 0.04–0.54)
EOSINOPHIL NFR BLD AUTO: 2.2 % (ref 0.7–7)
ERYTHROCYTE [DISTWIDTH] IN BLOOD BY AUTOMATED COUNT: 12.4 %
GFR SERPLBLD CREATININE-BSD FMLA CKD-EPI: 69 MLS/MIN/1.73/M2
GLOBULIN SER-MCNC: 2.9 GM/DL (ref 2–3.9)
GLUCOSE SERPL-MCNC: 99 MG/DL (ref 70–115)
HCT VFR BLD AUTO: 46.6 % (ref 36–52)
HGB BLD-MCNC: 15.6 G/DL (ref 13–18)
IMM GRANULOCYTES # BLD AUTO: 0.01 X10(3)/MCL (ref 0–0.03)
IMM GRANULOCYTES NFR BLD AUTO: 0.1 % (ref 0–0.5)
LYMPHOCYTES # BLD AUTO: 2.32 X10(3)/MCL (ref 1.32–3.57)
LYMPHOCYTES NFR BLD AUTO: 29.4 % (ref 20–55)
MAGNESIUM SERPL-MCNC: 1.7 MG/DL (ref 1.8–2.4)
MCH RBC QN AUTO: 32.2 PG (ref 27–34)
MCHC RBC AUTO-ENTMCNC: 33.5 G/DL (ref 31–37)
MCV RBC AUTO: 96.3 FL (ref 79–99)
MONOCYTES # BLD AUTO: 0.59 X10(3)/MCL (ref 0.3–0.82)
MONOCYTES NFR BLD AUTO: 7.5 % (ref 4.7–12.5)
NEUTROPHILS # BLD AUTO: 4.76 X10(3)/MCL (ref 1.78–5.38)
NEUTROPHILS NFR BLD AUTO: 60.3 % (ref 37–73)
NRBC BLD AUTO-RTO: 0 %
PLATELET # BLD AUTO: 344 X10(3)/MCL (ref 140–371)
PMV BLD AUTO: 8.9 FL (ref 9.4–12.4)
POTASSIUM SERPL-SCNC: 4.9 MMOL/L (ref 3.5–5.1)
PROT SERPL-MCNC: 7.4 GM/DL (ref 6.3–8.2)
RBC # BLD AUTO: 4.84 X10(6)/MCL (ref 4–6)
SODIUM SERPL-SCNC: 138 MMOL/L (ref 135–145)
WBC # SPEC AUTO: 7.89 X10(3)/MCL (ref 4–11.5)

## 2024-02-15 PROCEDURE — 3078F DIAST BP <80 MM HG: CPT | Mod: CPTII,,, | Performed by: NURSE PRACTITIONER

## 2024-02-15 PROCEDURE — 1159F MED LIST DOCD IN RCRD: CPT | Mod: CPTII,,, | Performed by: NURSE PRACTITIONER

## 2024-02-15 PROCEDURE — 83735 ASSAY OF MAGNESIUM: CPT | Performed by: NURSE PRACTITIONER

## 2024-02-15 PROCEDURE — 4010F ACE/ARB THERAPY RXD/TAKEN: CPT | Mod: CPTII,,, | Performed by: NURSE PRACTITIONER

## 2024-02-15 PROCEDURE — 3008F BODY MASS INDEX DOCD: CPT | Mod: CPTII,,, | Performed by: NURSE PRACTITIONER

## 2024-02-15 PROCEDURE — 85025 COMPLETE CBC W/AUTO DIFF WBC: CPT | Performed by: NURSE PRACTITIONER

## 2024-02-15 PROCEDURE — 80053 COMPREHEN METABOLIC PANEL: CPT | Performed by: NURSE PRACTITIONER

## 2024-02-15 PROCEDURE — 3074F SYST BP LT 130 MM HG: CPT | Mod: CPTII,,, | Performed by: NURSE PRACTITIONER

## 2024-02-15 PROCEDURE — 99214 OFFICE O/P EST MOD 30 MIN: CPT | Mod: ,,, | Performed by: NURSE PRACTITIONER

## 2024-02-15 PROCEDURE — 1160F RVW MEDS BY RX/DR IN RCRD: CPT | Mod: CPTII,,, | Performed by: NURSE PRACTITIONER

## 2024-02-15 NOTE — PROGRESS NOTES
Patient ID: Isaac Rodriguez  : 1965     Chief Complaint: Blurred Vision, Nausea, and Fatigue    Allergies: Patient has No Known Allergies.     History of Present Illness:  The patient is a 58 y.o. White male who presents to clinic for evaluation and management with a chief complaint of Blurred Vision, Nausea, and Fatigue   Patient reports feeling a little weak, upset stomach and headache starting 1 week ago. Reports feeling more jittery 4 days ago with blurred vision. Reports mild headache for past 3 days with blurred vision. Did have diarrhea for 2 days but that resolved. Dizziness improved yesterday. Denies any vomiting. Still having some generalized muscle cramps even though these are improving.  Patient reports dizziness worse with changing positions     Nausea  Associated symptoms include fatigue and nausea.   Fatigue  Associated symptoms include fatigue and nausea.        Past Medical History:  has a past medical history of Depression, HTN (hypertension), Hyperlipidemia, and Kidney stones.    Social History:  reports that he has never smoked. He has never used smokeless tobacco. He reports that he does not currently use alcohol. He reports that he does not use drugs.    Care Team: Patient Care Team:  Enoc Lezama APRN as PCP - General (Family Medicine)  Efrain Navas OD as Consulting Physician (Optometry)  Shyam Junior DPM (Podiatry)     Current Medications:  Current Outpatient Medications   Medication Instructions    atorvastatin (LIPITOR) 20 mg, Oral, Nightly    DULoxetine (CYMBALTA) 30 MG capsule Take 1 capsule (30 mg total) by mouth once daily for 5 days, THEN 2 capsules (60 mg total) once daily.    metFORMIN (GLUCOPHAGE-XR) 500 mg, Oral, Daily    naproxen (NAPROSYN) 500 mg, Oral, 2 times daily with meals    olmesartan (BENICAR) 20 mg, Oral, Daily    pantoprazole (PROTONIX) 20 MG tablet TAKE ONE TABLET BY MOUTH EVERY MORNING EMPTY stomach       Review of Systems   Constitutional:  " Positive for fatigue.   Gastrointestinal:  Positive for nausea.   Neurological:  Positive for dizziness.        Visit Vitals  BP 90/68 (Patient Position: Standing)   Pulse (!) 111   Temp 98.4 °F (36.9 °C) (Temporal)   Ht 5' 7.01" (1.702 m)   Wt 107 kg (236 lb)   SpO2 95%   BMI 36.95 kg/m²       Physical Exam  Vitals and nursing note reviewed.   Constitutional:       Appearance: Normal appearance. He is obese.   HENT:      Head: Normocephalic and atraumatic.      Right Ear: A middle ear effusion is present. Tympanic membrane is not injected, erythematous or bulging.      Left Ear: No tenderness. Tympanic membrane is perforated (central perforation without erythema, drainage, tenderness).      Nose: Nose normal. No congestion.      Mouth/Throat:      Mouth: Mucous membranes are moist.      Pharynx: Oropharynx is clear. No oropharyngeal exudate or posterior oropharyngeal erythema.   Eyes:      Extraocular Movements: Extraocular movements intact.      Conjunctiva/sclera: Conjunctivae normal.      Comments: Wearing eyeglasses   Cardiovascular:      Rate and Rhythm: Normal rate and regular rhythm.      Pulses: Normal pulses.      Heart sounds: No murmur heard.  Pulmonary:      Effort: Pulmonary effort is normal.      Breath sounds: Normal breath sounds.   Musculoskeletal:         General: No swelling or tenderness. Normal range of motion.   Lymphadenopathy:      Cervical: No cervical adenopathy.   Skin:     General: Skin is warm and dry.   Neurological:      General: No focal deficit present.      Mental Status: He is alert and oriented to person, place, and time.   Psychiatric:         Mood and Affect: Mood normal.         Judgment: Judgment normal.          Labs Reviewed:  Chemistry:  Lab Results   Component Value Date     07/14/2023    K 4.2 07/14/2023    CHLORIDE 103 07/14/2023    BUN 18.0 07/14/2023    CREATININE 1.04 07/14/2023    EGFRNORACEVR 84 07/14/2023    GLUCOSE 98 07/14/2023    CALCIUM 9.7 07/14/2023 "    ALKPHOS 107 07/14/2023    LABPROT 6.9 07/14/2023    ALBUMIN 4.4 07/14/2023    AST 52 07/14/2023    ALT 64 (H) 07/14/2023    TSH 3.720 07/14/2023    PSA 0.68 07/14/2023        Lab Results   Component Value Date    HGBA1C 5.7 07/14/2023        Hematology:  Lab Results   Component Value Date    WBC 5.98 07/14/2023    RBC 5.00 07/14/2023    HGB 15.7 07/14/2023    HCT 47.2 07/14/2023    MCV 94.4 07/14/2023    MCH 31.4 07/14/2023    MCHC 33.3 07/14/2023    RDW 13.0 07/14/2023     07/14/2023    MPV 10.0 07/14/2023    MONO 701 11/15/2022    MONO 7.7 11/15/2022    BASO 36 11/15/2022    EOSINOPHIL 1.5 11/15/2022    BASOPHIL 0.4 11/15/2022       Lipid Panel:  Lab Results   Component Value Date    CHOL 239 (H) 07/14/2023    HDL 41 07/14/2023    DLDL 152.6 (H) 07/14/2023    LDLCALC 110 (H) 11/15/2022    TRIG 227 (H) 07/14/2023        Assessment & Plan:  1. Ruptured ear drum, left  Comments:  discussed water precautions, S&S of infection    2. Dizziness  -     CBC Auto Differential; Future; Expected date: 02/15/2024  -     Comprehensive Metabolic Panel; Future; Expected date: 02/15/2024  -     Magnesium; Future; Expected date: 03/14/2024    3. Other fatigue    4. Leg cramps  -     CBC Auto Differential; Future; Expected date: 02/15/2024  -     Comprehensive Metabolic Panel; Future; Expected date: 02/15/2024  -     Magnesium; Future; Expected date: 03/14/2024    5. Orthostatic hypotension  Comments:  encouraged hydration and changing positions slowly, hold olmesartan for now.    6. Anxiety  Overview:  Taking cymbalta      7. Depressive disorder  Overview:  Changed effexor to cymbalta,            Future Appointments   Date Time Provider Department Center   2/23/2024  7:30 AM LezamaEnoc vela APRN [facility]   7/9/2024  8:30 AM LAB, [facility] LABORATORY DRAW ST[facility] [place] [facility]   10/17/2024  9:00 AM Enoc Lezama APRN [facility]       Follow up for 1 wk f/u orthostatic hypotension, dizziness.  Call sooner if needed.    DEANNE RODRÍGUEZ    Lab Frequency Next Occurrence   MANJIT Once 03/19/2023   CBC Auto Differential Once 07/23/2024   Comprehensive Metabolic Panel Once 07/23/2024   Lipid Panel Once 07/23/2024   TSH Once 07/23/2024   Hemoglobin A1C Once 07/23/2024

## 2024-02-16 ENCOUNTER — TELEPHONE (OUTPATIENT)
Dept: FAMILY MEDICINE | Facility: CLINIC | Age: 59
End: 2024-02-16
Payer: COMMERCIAL

## 2024-02-16 NOTE — TELEPHONE ENCOUNTER
----- Message from DEANNE Cortez sent at 2/16/2024  8:23 AM CST -----  Please notify patient of following results:   Magnesium level slightly low. Can begin magnesium replacement with OTC mag oxide.

## 2024-02-17 DIAGNOSIS — F41.9 ANXIETY: ICD-10-CM

## 2024-02-17 DIAGNOSIS — F32.A DEPRESSIVE DISORDER: ICD-10-CM

## 2024-02-19 RX ORDER — DULOXETIN HYDROCHLORIDE 30 MG/1
CAPSULE, DELAYED RELEASE ORAL
Qty: 180 CAPSULE | Refills: 0 | Status: SHIPPED | OUTPATIENT
Start: 2024-02-19 | End: 2024-05-13

## 2024-02-20 ENCOUNTER — OFFICE VISIT (OUTPATIENT)
Dept: FAMILY MEDICINE | Facility: CLINIC | Age: 59
End: 2024-02-20
Payer: COMMERCIAL

## 2024-02-20 VITALS
BODY MASS INDEX: 37.2 KG/M2 | OXYGEN SATURATION: 97 % | TEMPERATURE: 98 F | DIASTOLIC BLOOD PRESSURE: 82 MMHG | HEART RATE: 81 BPM | WEIGHT: 237 LBS | SYSTOLIC BLOOD PRESSURE: 110 MMHG | HEIGHT: 67 IN

## 2024-02-20 DIAGNOSIS — F32.A DEPRESSIVE DISORDER: ICD-10-CM

## 2024-02-20 DIAGNOSIS — R42 DIZZINESS: ICD-10-CM

## 2024-02-20 DIAGNOSIS — H72.92 RUPTURED EAR DRUM, LEFT: ICD-10-CM

## 2024-02-20 DIAGNOSIS — J01.10 ACUTE NON-RECURRENT FRONTAL SINUSITIS: Primary | ICD-10-CM

## 2024-02-20 DIAGNOSIS — F41.9 ANXIETY: ICD-10-CM

## 2024-02-20 PROCEDURE — 3074F SYST BP LT 130 MM HG: CPT | Mod: CPTII,,, | Performed by: NURSE PRACTITIONER

## 2024-02-20 PROCEDURE — 3008F BODY MASS INDEX DOCD: CPT | Mod: CPTII,,, | Performed by: NURSE PRACTITIONER

## 2024-02-20 PROCEDURE — 1160F RVW MEDS BY RX/DR IN RCRD: CPT | Mod: CPTII,,, | Performed by: NURSE PRACTITIONER

## 2024-02-20 PROCEDURE — 4010F ACE/ARB THERAPY RXD/TAKEN: CPT | Mod: CPTII,,, | Performed by: NURSE PRACTITIONER

## 2024-02-20 PROCEDURE — 3079F DIAST BP 80-89 MM HG: CPT | Mod: CPTII,,, | Performed by: NURSE PRACTITIONER

## 2024-02-20 PROCEDURE — 1159F MED LIST DOCD IN RCRD: CPT | Mod: CPTII,,, | Performed by: NURSE PRACTITIONER

## 2024-02-20 PROCEDURE — 99214 OFFICE O/P EST MOD 30 MIN: CPT | Mod: ,,, | Performed by: NURSE PRACTITIONER

## 2024-02-20 RX ORDER — AMOXICILLIN AND CLAVULANATE POTASSIUM 875; 125 MG/1; MG/1
1 TABLET, FILM COATED ORAL 2 TIMES DAILY
Qty: 20 TABLET | Refills: 0 | Status: SHIPPED | OUTPATIENT
Start: 2024-02-20 | End: 2024-03-01

## 2024-02-20 NOTE — PROGRESS NOTES
Patient ID: Isaac Rodriguez  : 1965     Chief Complaint: Dizziness and Hypotension    Allergies: Patient has No Known Allergies.     History of Present Illness:  The patient is a 58 y.o. White male who presents to clinic for evaluation and management with a chief complaint of Dizziness and Hypotension   Patient reports joint aches and pains have greatly improved. Still experiencing dizziness, especially when going from sitting to standing position. Still having intermittent blurred vision.   Denies any fall. Still having congestion, sinus drip, frontal headaches, tooth pressure.     Dizziness:   Chronicity:  Recurrent  Onset:  1 to 4 weeks ago (2 weeks)  Progression since onset:  Waxing and waning  Frequency:  Every few days  Severity:  Moderate  Dizziness characteristics:  Off-balance and trouble focusing eyes   Associated symptoms: hearing loss, visual disturbances and light-headedness.no ear congestion, no ear pain, no tinnitus, no aural fullness, no panic, no facial weakness, no slurred speech and no numbness in extremities.  Aggravated by:  Getting up and position changes  Treatments tried:  Rest and body position changes  Improvements on treatment:  Mild       Past Medical History:  has a past medical history of Depression, HTN (hypertension), Hyperlipidemia, and Kidney stones.    Social History:  reports that he has never smoked. He has never used smokeless tobacco. He reports that he does not currently use alcohol. He reports that he does not use drugs.    Care Team: Patient Care Team:  Enoc Lezama APRN as PCP - General (Family Medicine)  Efrain Navas OD as Consulting Physician (Optometry)  Shyam Junior DPM (Podiatry)     Current Medications:  Current Outpatient Medications   Medication Instructions    amoxicillin-clavulanate 875-125mg (AUGMENTIN) 875-125 mg per tablet 1 tablet, Oral, 2 times daily    atorvastatin (LIPITOR) 20 mg, Oral, Nightly    DULoxetine (CYMBALTA) 30 MG  "capsule TAKE 1 CAPSULE BY MOUTH EVERY DAY FOR 5 DAYS THEN 2 CAPSULES DAILY    metFORMIN (GLUCOPHAGE-XR) 500 mg, Oral, Daily    naproxen (NAPROSYN) 500 mg, Oral, 2 times daily with meals    olmesartan (BENICAR) 20 mg, Oral, Daily    pantoprazole (PROTONIX) 20 MG tablet TAKE ONE TABLET BY MOUTH EVERY MORNING EMPTY stomach       Review of Systems   HENT:  Positive for hearing loss. Negative for ear pain and tinnitus.    Neurological:  Positive for dizziness and light-headedness.        Visit Vitals  /82 (BP Location: Right arm)   Pulse 81   Temp 98.2 °F (36.8 °C) (Temporal)   Ht 5' 7.01" (1.702 m)   Wt 107.5 kg (237 lb)   SpO2 97%   BMI 37.11 kg/m²       Physical Exam  Vitals and nursing note reviewed.   Constitutional:       Appearance: Normal appearance. He is obese.   HENT:      Head: Normocephalic and atraumatic.      Right Ear: A middle ear effusion is present. Tympanic membrane is not injected, erythematous or bulging.      Left Ear: No tenderness. Tympanic membrane is perforated (central perforation without erythema, drainage, tenderness).      Nose: Nose normal. No congestion.      Mouth/Throat:      Mouth: Mucous membranes are moist.      Pharynx: Oropharynx is clear. No oropharyngeal exudate or posterior oropharyngeal erythema.   Eyes:      Extraocular Movements: Extraocular movements intact.      Conjunctiva/sclera: Conjunctivae normal.      Comments: Wearing eyeglasses   Cardiovascular:      Rate and Rhythm: Normal rate and regular rhythm.      Pulses: Normal pulses.      Heart sounds: No murmur heard.  Pulmonary:      Effort: Pulmonary effort is normal.      Breath sounds: Normal breath sounds.   Musculoskeletal:         General: No swelling or tenderness. Normal range of motion.   Lymphadenopathy:      Cervical: No cervical adenopathy.   Skin:     General: Skin is warm and dry.   Neurological:      General: No focal deficit present.      Mental Status: He is alert and oriented to person, place, and " time.   Psychiatric:         Mood and Affect: Mood normal.         Judgment: Judgment normal.          Labs Reviewed:  Chemistry:  Lab Results   Component Value Date     02/15/2024    K 4.9 02/15/2024    CHLORIDE 104 02/15/2024    BUN 28.0 (H) 02/15/2024    CREATININE 1.22 02/15/2024    EGFRNORACEVR 69 02/15/2024    GLUCOSE 99 02/15/2024    CALCIUM 9.8 02/15/2024    ALKPHOS 97 02/15/2024    LABPROT 7.4 02/15/2024    ALBUMIN 4.5 02/15/2024    AST 34 02/15/2024    ALT 40 02/15/2024    MG 1.70 (L) 02/15/2024    TSH 3.720 07/14/2023    PSA 0.68 07/14/2023        Lab Results   Component Value Date    HGBA1C 5.7 07/14/2023        Hematology:  Lab Results   Component Value Date    WBC 7.89 02/15/2024    RBC 4.84 02/15/2024    HGB 15.6 02/15/2024    HCT 46.6 02/15/2024    MCV 96.3 02/15/2024    MCH 32.2 02/15/2024    MCHC 33.5 02/15/2024    RDW 12.4 02/15/2024     02/15/2024    MPV 8.9 (L) 02/15/2024    MONO 701 11/15/2022    MONO 7.7 11/15/2022    BASO 36 11/15/2022    EOSINOPHIL 1.5 11/15/2022    BASOPHIL 0.4 11/15/2022       Lipid Panel:  Lab Results   Component Value Date    CHOL 239 (H) 07/14/2023    HDL 41 07/14/2023    DLDL 152.6 (H) 07/14/2023    LDLCALC 110 (H) 11/15/2022    TRIG 227 (H) 07/14/2023        Assessment & Plan:  1. Acute non-recurrent frontal sinusitis  Comments:  complete abx as prescribed  Orders:  -     amoxicillin-clavulanate 875-125mg (AUGMENTIN) 875-125 mg per tablet; Take 1 tablet by mouth 2 (two) times daily. for 10 days  Dispense: 20 tablet; Refill: 0    2. Dizziness  Comments:  discussed various causes and work up including cardiology, ENT, brain imagaing. Patient will consider if no improvement with tx of sinus    3. Ruptured ear drum, left    4. Depressive disorder  Overview:  Improved with cymbalta,       5. Anxiety  Overview:  Improved with cymbalta           Future Appointments   Date Time Provider Department Center   7/9/2024  8:30 AM LAB, [facility] LABORATORY [facility]  [facility]   10/17/2024  9:00 AM Enoc Lezama APRN [facility]       Follow up for schedule Wellness after July 14, 2024, has lab on 7/9. Call sooner if needed.    DEANNE RODRÍGUEZ    Lab Frequency Next Occurrence   CBC Auto Differential Once 07/23/2024   Comprehensive Metabolic Panel Once 07/23/2024   Lipid Panel Once 07/23/2024   TSH Once 07/23/2024   Hemoglobin A1C Once 07/23/2024

## 2024-03-02 DIAGNOSIS — M25.552 LEFT HIP PAIN: ICD-10-CM

## 2024-03-02 DIAGNOSIS — M54.42 ACUTE LEFT-SIDED LOW BACK PAIN WITH LEFT-SIDED SCIATICA: ICD-10-CM

## 2024-03-04 RX ORDER — NAPROXEN 500 MG/1
500 TABLET ORAL 2 TIMES DAILY WITH MEALS
Qty: 60 TABLET | Refills: 4 | Status: SHIPPED | OUTPATIENT
Start: 2024-03-04

## 2024-03-28 DIAGNOSIS — R12 HEARTBURN: ICD-10-CM

## 2024-03-28 RX ORDER — PANTOPRAZOLE SODIUM 20 MG/1
TABLET, DELAYED RELEASE ORAL
Qty: 90 TABLET | Refills: 1 | Status: SHIPPED | OUTPATIENT
Start: 2024-03-28

## 2024-04-09 ENCOUNTER — OFFICE VISIT (OUTPATIENT)
Dept: FAMILY MEDICINE | Facility: CLINIC | Age: 59
End: 2024-04-09
Payer: COMMERCIAL

## 2024-04-09 VITALS
BODY MASS INDEX: 37.83 KG/M2 | DIASTOLIC BLOOD PRESSURE: 78 MMHG | HEART RATE: 90 BPM | SYSTOLIC BLOOD PRESSURE: 132 MMHG | HEIGHT: 67 IN | TEMPERATURE: 98 F | WEIGHT: 241 LBS | OXYGEN SATURATION: 95 %

## 2024-04-09 DIAGNOSIS — H72.92 RUPTURED EAR DRUM, LEFT: ICD-10-CM

## 2024-04-09 DIAGNOSIS — J06.9 ACUTE UPPER RESPIRATORY INFECTION: ICD-10-CM

## 2024-04-09 DIAGNOSIS — E66.01 CLASS 2 SEVERE OBESITY DUE TO EXCESS CALORIES WITH SERIOUS COMORBIDITY AND BODY MASS INDEX (BMI) OF 37.0 TO 37.9 IN ADULT: ICD-10-CM

## 2024-04-09 DIAGNOSIS — R05.9 COUGH, UNSPECIFIED TYPE: ICD-10-CM

## 2024-04-09 DIAGNOSIS — J40 BRONCHITIS: Primary | ICD-10-CM

## 2024-04-09 DIAGNOSIS — R09.81 NASAL CONGESTION: ICD-10-CM

## 2024-04-09 PROBLEM — E66.812 CLASS 2 SEVERE OBESITY DUE TO EXCESS CALORIES WITH SERIOUS COMORBIDITY AND BODY MASS INDEX (BMI) OF 37.0 TO 37.9 IN ADULT: Status: ACTIVE | Noted: 2020-06-25

## 2024-04-09 LAB
CTP QC/QA: YES
CTP QC/QA: YES
FLUAV AG NPH QL: NEGATIVE
FLUBV AG NPH QL: NEGATIVE
SARS-COV-2 AG RESP QL IA.RAPID: NEGATIVE

## 2024-04-09 PROCEDURE — 3008F BODY MASS INDEX DOCD: CPT | Mod: CPTII,,, | Performed by: NURSE PRACTITIONER

## 2024-04-09 PROCEDURE — 1160F RVW MEDS BY RX/DR IN RCRD: CPT | Mod: CPTII,,, | Performed by: NURSE PRACTITIONER

## 2024-04-09 PROCEDURE — 87811 SARS-COV-2 COVID19 W/OPTIC: CPT | Mod: QW,,, | Performed by: NURSE PRACTITIONER

## 2024-04-09 PROCEDURE — 87400 INFLUENZA A/B EACH AG IA: CPT | Mod: QW,,, | Performed by: NURSE PRACTITIONER

## 2024-04-09 PROCEDURE — 3078F DIAST BP <80 MM HG: CPT | Mod: CPTII,,, | Performed by: NURSE PRACTITIONER

## 2024-04-09 PROCEDURE — 1159F MED LIST DOCD IN RCRD: CPT | Mod: CPTII,,, | Performed by: NURSE PRACTITIONER

## 2024-04-09 PROCEDURE — 99214 OFFICE O/P EST MOD 30 MIN: CPT | Mod: ,,, | Performed by: NURSE PRACTITIONER

## 2024-04-09 PROCEDURE — 4010F ACE/ARB THERAPY RXD/TAKEN: CPT | Mod: CPTII,,, | Performed by: NURSE PRACTITIONER

## 2024-04-09 PROCEDURE — 3075F SYST BP GE 130 - 139MM HG: CPT | Mod: CPTII,,, | Performed by: NURSE PRACTITIONER

## 2024-04-09 RX ORDER — PROMETHAZINE HYDROCHLORIDE AND DEXTROMETHORPHAN HYDROBROMIDE 6.25; 15 MG/5ML; MG/5ML
5 SYRUP ORAL EVERY 6 HOURS PRN
Qty: 120 ML | Refills: 0 | Status: SHIPPED | OUTPATIENT
Start: 2024-04-09 | End: 2024-04-19

## 2024-04-09 RX ORDER — PREDNISONE 20 MG/1
40 TABLET ORAL DAILY
Qty: 10 TABLET | Refills: 0 | Status: SHIPPED | OUTPATIENT
Start: 2024-04-09 | End: 2024-04-14

## 2024-04-09 NOTE — PROGRESS NOTES
Patient ID: Isaac Rodriguez  : 1965     Chief Complaint: Sore Throat, Nasal Congestion, and Cough (3 days)    Allergies: Patient has No Known Allergies.     History of Present Illness:  The patient is a 58 y.o. White male who presents to clinic for evaluation and management with a chief complaint of Sore Throat, Nasal Congestion, and Cough (3 days)   Patient reports began 3 days ago with cough and congestion. Been taking nyquil and dayquil without much relief.     Sore Throat   This is a new problem. The current episode started in the past 7 days. The problem has been unchanged. There has been no fever. Associated symptoms include congestion, coughing and headaches. Pertinent negatives include no abdominal pain, diarrhea, drooling, ear discharge, ear pain, hoarse voice, plugged ear sensation, neck pain, shortness of breath, stridor, swollen glands, trouble swallowing or vomiting.   Cough  This is a new problem. The current episode started in the past 7 days. The problem has been unchanged. The cough is Non-productive. Associated symptoms include headaches, nasal congestion, postnasal drip and a sore throat. Pertinent negatives include no chest pain, chills, ear congestion, ear pain, fever, heartburn, hemoptysis, myalgias, rash, rhinorrhea, shortness of breath, sweats, weight loss or wheezing.        Past Medical History:  has a past medical history of Depression, HTN (hypertension), Hyperlipidemia, and Kidney stones.    Social History:  reports that he has never smoked. He has never used smokeless tobacco. He reports that he does not currently use alcohol. He reports that he does not use drugs.    Care Team: Patient Care Team:  Enoc Lezama APRN as PCP - General (Family Medicine)  Efrain Navas OD as Consulting Physician (Optometry)  Shyam Junior DPM (Podiatry)     Current Medications:  Current Outpatient Medications   Medication Instructions    atorvastatin (LIPITOR) 20 mg, Oral, Nightly  "   DULoxetine (CYMBALTA) 30 MG capsule TAKE 1 CAPSULE BY MOUTH EVERY DAY FOR 5 DAYS THEN 2 CAPSULES DAILY    metFORMIN (GLUCOPHAGE-XR) 500 mg, Oral, Daily    naproxen (NAPROSYN) 500 mg, Oral, 2 times daily with meals    pantoprazole (PROTONIX) 20 MG tablet TAKE ONE TABLET BY MOUTH EVERY MORNING EMPTY STOMACH    predniSONE (DELTASONE) 40 mg, Oral, Daily    promethazine-dextromethorphan (PROMETHAZINE-DM) 6.25-15 mg/5 mL Syrp 5 mLs, Oral, Every 6 hours PRN       Review of Systems   Constitutional:  Negative for chills, fever and weight loss.   HENT:  Positive for nasal congestion, postnasal drip and sore throat. Negative for drooling, ear discharge, ear pain, hoarse voice, rhinorrhea and trouble swallowing.    Respiratory:  Positive for cough. Negative for hemoptysis, shortness of breath, wheezing and stridor.    Cardiovascular:  Negative for chest pain.   Gastrointestinal:  Negative for abdominal pain, diarrhea, heartburn and vomiting.   Musculoskeletal:  Negative for myalgias and neck pain.   Integumentary:  Negative for rash.   Neurological:  Positive for headaches.        Visit Vitals  /78 (BP Location: Right arm, Patient Position: Sitting, BP Method: Medium (Manual))   Pulse 90   Temp 98.2 °F (36.8 °C) (Temporal)   Ht 5' 7.01" (1.702 m)   Wt 109.3 kg (241 lb)   SpO2 95%   BMI 37.74 kg/m²       Physical Exam  Vitals and nursing note reviewed.   Constitutional:       Appearance: Normal appearance. He is obese.   HENT:      Head: Normocephalic and atraumatic.      Right Ear: No tenderness. Tympanic membrane is not injected, erythematous or bulging.      Left Ear: No tenderness. Tympanic membrane is perforated (central perforation without erythema, drainage, tenderness). Tympanic membrane is not injected.      Nose: Nose normal. No congestion.      Mouth/Throat:      Mouth: Mucous membranes are moist.      Pharynx: Oropharynx is clear. No oropharyngeal exudate or posterior oropharyngeal erythema.   Eyes:      " Extraocular Movements: Extraocular movements intact.      Conjunctiva/sclera: Conjunctivae normal.      Comments: Wearing eyeglasses   Cardiovascular:      Rate and Rhythm: Normal rate and regular rhythm.      Pulses: Normal pulses.      Heart sounds: No murmur heard.  Pulmonary:      Effort: Pulmonary effort is normal.      Breath sounds: Wheezing (late faint expiratory RUL) present.   Musculoskeletal:         General: No swelling or tenderness. Normal range of motion.   Lymphadenopathy:      Cervical: No cervical adenopathy.   Skin:     General: Skin is warm and dry.   Neurological:      General: No focal deficit present.      Mental Status: He is alert and oriented to person, place, and time.   Psychiatric:         Mood and Affect: Mood normal.         Judgment: Judgment normal.          Labs Reviewed:  Chemistry:  Lab Results   Component Value Date     02/15/2024    K 4.9 02/15/2024    CHLORIDE 104 02/15/2024    BUN 28.0 (H) 02/15/2024    CREATININE 1.22 02/15/2024    EGFRNORACEVR 69 02/15/2024    GLUCOSE 99 02/15/2024    CALCIUM 9.8 02/15/2024    ALKPHOS 97 02/15/2024    LABPROT 7.4 02/15/2024    ALBUMIN 4.5 02/15/2024    AST 34 02/15/2024    ALT 40 02/15/2024    MG 1.70 (L) 02/15/2024    TSH 3.720 07/14/2023    PSA 0.68 07/14/2023        Lab Results   Component Value Date    HGBA1C 5.7 07/14/2023        Hematology:  Lab Results   Component Value Date    WBC 7.89 02/15/2024    RBC 4.84 02/15/2024    HGB 15.6 02/15/2024    HCT 46.6 02/15/2024    MCV 96.3 02/15/2024    MCH 32.2 02/15/2024    MCHC 33.5 02/15/2024    RDW 12.4 02/15/2024     02/15/2024    MPV 8.9 (L) 02/15/2024    MONO 701 11/15/2022    MONO 7.7 11/15/2022    BASO 36 11/15/2022    EOSINOPHIL 1.5 11/15/2022    BASOPHIL 0.4 11/15/2022       Lipid Panel:  Lab Results   Component Value Date    CHOL 239 (H) 07/14/2023    HDL 41 07/14/2023    DLDL 152.6 (H) 07/14/2023    LDLCALC 110 (H) 11/15/2022    TRIG 227 (H) 07/14/2023        Assessment  & Plan:  1. Bronchitis  Comments:  notify of any wrosening symptoms, will order cxr, begin steroids, continue mucinex  Orders:  -     predniSONE (DELTASONE) 20 MG tablet; Take 2 tablets (40 mg total) by mouth once daily. for 5 days  Dispense: 10 tablet; Refill: 0  -     promethazine-dextromethorphan (PROMETHAZINE-DM) 6.25-15 mg/5 mL Syrp; Take 5 mLs by mouth every 6 (six) hours as needed (cough).  Dispense: 120 mL; Refill: 0    2. Acute upper respiratory infection  Assessment & Plan:  Discussed viral vs bacterial infections. Educated on proper technique for nasal sprays and to notify if symptoms worsen or fail to improve over next week or develop temperature greater than 101.Will call out antibiotics. Discussed symptomatic treatment with OTC medications. Encouraged rest and hydration. Patient states understanding.         3. Nasal congestion  -     POCT Influenza A/B Rapid Antigen  -     SARS Coronavirus 2 Antigen, POCT Manual Read    4. Cough, unspecified type  -     POCT Influenza A/B Rapid Antigen  -     SARS Coronavirus 2 Antigen, POCT Manual Read    5. Class 2 severe obesity due to excess calories with serious comorbidity and body mass index (BMI) of 37.0 to 37.9 in adult  Assessment & Plan:  This is a chronic and a comorbid condition that affected my decision making today. Patient educated on importance of diet and exercise.  Weight loss goals discussed. Recommended 30-45 minutes of exercise five days a week.  In addition, counseled patient on importance of low fat diet, limiting carbohydrate intake, and increasing protein and vegetable intake. Hand outs provided. All questions answered.         6. Ruptured ear drum, left  Comments:  reinforced water precautions         Future Appointments   Date Time Provider Department Center   7/9/2024  8:30 AM LAB, [facility] LABORATORY DRAW STATION [facility]   8/5/2024  2:00 PM Enoc Lezama APRN [facility]       Follow up if symptoms worsen or fail  to improve, for Keep Apt as Scheduled. Call sooner if needed.    DEANNE RODRÍGUEZ    Lab Frequency Next Occurrence   CBC Auto Differential Once 07/23/2024   Comprehensive Metabolic Panel Once 07/23/2024   Lipid Panel Once 07/23/2024   TSH Once 07/23/2024   Hemoglobin A1C Once 07/23/2024

## 2024-04-23 ENCOUNTER — TELEPHONE (OUTPATIENT)
Dept: FAMILY MEDICINE | Facility: CLINIC | Age: 59
End: 2024-04-23
Payer: COMMERCIAL

## 2024-04-23 DIAGNOSIS — J01.00 ACUTE NON-RECURRENT MAXILLARY SINUSITIS: Primary | ICD-10-CM

## 2024-04-23 RX ORDER — CEFDINIR 300 MG/1
300 CAPSULE ORAL 2 TIMES DAILY
Qty: 20 CAPSULE | Refills: 0 | Status: SHIPPED | OUTPATIENT
Start: 2024-04-23 | End: 2024-05-03

## 2024-04-23 NOTE — TELEPHONE ENCOUNTER
Patient c/o coughing he was last seen on 4/9/24 for sore throat, nasal congestion and cough he tested negative for both flu and covid. Please advise.

## 2024-04-23 NOTE — TELEPHONE ENCOUNTER
OK to order cxr if patient would like to have done at hospital. I have written for the following medications and/or orders for the patient.  Please notify the patient.         Medications Ordered This Encounter   Medications    cefdinir (OMNICEF) 300 MG capsule     Sig: Take 1 capsule (300 mg total) by mouth 2 (two) times daily. for 10 days     Dispense:  20 capsule     Refill:  0

## 2024-04-25 ENCOUNTER — TELEPHONE (OUTPATIENT)
Dept: FAMILY MEDICINE | Facility: CLINIC | Age: 59
End: 2024-04-25
Payer: COMMERCIAL

## 2024-04-25 NOTE — PROGRESS NOTES
"  Assessment & Plan     Current nicotine use    - nicotine (NICODERM CQ) 14 MG/24HR 24 hr patch; Place 1 patch onto the skin every 24 hours  - nicotine (NICODERM CQ) 21 MG/24HR 24 hr patch; Place 1 patch onto the skin every 24 hours  - nicotine (NICODERM CQ) 7 MG/24HR 24 hr patch; Place 1 patch onto the skin every 24 hours  - nicotine (NICORETTE) 2 MG gum; Place 1 each (2 mg) inside cheek every hour as needed for nicotine withdrawal symptoms    Patient reports he smokes half a pack per day  He has been a smoker for many years, for almost 14 years.  Has not started any nicotine patches or any other therapy in the past, has not tried to quite in the past.    I did congratulate the patient about his willing to quit smoking, discussed health benefits of quite smoking.  Started nicotine patches ongoing use as been prescribed.        BMI  Estimated body mass index is 27.62 kg/m  as calculated from the following:    Height as of this encounter: 1.628 m (5' 4.1\").    Weight as of this encounter: 73.2 kg (161 lb 6.4 oz).   Weight management plan: Discussed healthy diet and exercise guidelines      Work on weight loss  Regular exercise    Subjective   Bebeto is a 30 year old, presenting for the following health issues:  Smoking Cessation (Would like to talk about medication to help him quit smoking )        4/25/2024     2:21 PM   Additional Questions   Roomed by Dian   Accompanied by Spouse Jhon         4/25/2024     2:21 PM   Patient Reported Additional Medications   Patient reports taking the following new medications none     History of Present Illness       Reason for visit:  Smoke    He eats 0-1 servings of fruits and vegetables daily.He consumes 3 sweetened beverage(s) daily.He exercises with enough effort to increase his heart rate 9 or less minutes per day.  He exercises with enough effort to increase his heart rate 3 or less days per week.   He is taking medications regularly.         Review of " Patient ID: Isaac Rodriguez  : 1965     Chief Complaint: Hip Pain and Leg Pain    Allergies: Patient has No Known Allergies.     History of Present Illness:  The patient is a 58 y.o. White male who presents to clinic for evaluation and management with a chief complaint of Hip Pain and Leg Pain   Patient reports mood improved with venlafaxine in the past, currenlty going through separation from wife maren avendano who has cancer.  restarted venlafaxine, mood improved but not at goal       Hip Pain   The incident occurred more than 1 week ago. There was no injury mechanism. The pain is present in the left hip and left thigh. The quality of the pain is described as aching, burning and shooting. The pain is moderate. The pain has been Fluctuating since onset. He reports no foreign bodies present. The symptoms are aggravated by movement and weight bearing. He has tried acetaminophen and NSAIDs for the symptoms. The treatment provided mild relief.   Depression  Visit Type: follow-up  Patient presents with the following symptoms: depressed mood, fatigue, insomnia, irritability, muscle tension, nervousness/anxiety and thoughts of death.  Patient is not experiencing: anhedonia, chest pain, choking sensation, confusion, decreased concentration, dizziness, panic, psychomotor agitation, psychomotor retardation, restlessness, suicidal ideas, suicidal planning, weight gain and weight loss.           Past Medical History:  has a past medical history of Depression, HTN (hypertension), Hyperlipidemia, and Kidney stones.    Social History:  reports that he has never smoked. He has never used smokeless tobacco. He reports that he does not currently use alcohol. He reports that he does not use drugs.    Care Team: Patient Care Team:  Enoc Lezama APRN as PCP - General (Family Medicine)  Efrain Navas OD as Consulting Physician (Optometry)  Shyam Junior DPM (Podiatry)     Current Medications:  Current  "Outpatient Medications   Medication Instructions    atorvastatin (LIPITOR) 20 mg, Oral, Nightly    diclofenac sodium (VOLTAREN) 2 g, Topical (Top), 4 times daily    metFORMIN (GLUCOPHAGE-XR) 500 mg, Oral, Daily    naproxen (NAPROSYN) 500 mg, Oral, 2 times daily with meals    olmesartan (BENICAR) 20 mg, Oral, Daily    pantoprazole (PROTONIX) 20 MG tablet TAKE ONE TABLET BY MOUTH EVERY MORNING EMPTY stomach    tiZANidine (ZANAFLEX) 4 mg, Oral, Every 8 hours PRN    venlafaxine (EFFEXOR-XR) 75 mg, Oral, Daily       Review of Systems   Constitutional:  Positive for irritability. Negative for weight gain and weight loss.   Respiratory:  Negative for choking.    Psychiatric/Behavioral:  Positive for depression. Negative for confusion, decreased concentration and suicidal ideas. The patient is nervous/anxious and has insomnia.         Visit Vitals  /84 (BP Location: Right arm, Patient Position: Sitting, BP Method: Medium (Manual))   Pulse 86   Temp 98.2 °F (36.8 °C) (Temporal)   Ht 5' 7.01" (1.702 m)   Wt 110.8 kg (244 lb 3.2 oz)   SpO2 97%   BMI 38.24 kg/m²       Physical Exam  Vitals and nursing note reviewed.   Constitutional:       Appearance: Normal appearance. He is obese.   HENT:      Head: Normocephalic and atraumatic.      Nose: Nose normal.      Mouth/Throat:      Mouth: Mucous membranes are moist.      Pharynx: Oropharynx is clear.   Eyes:      Conjunctiva/sclera: Conjunctivae normal.      Comments: Wearing eyeglasses   Cardiovascular:      Rate and Rhythm: Normal rate and regular rhythm.      Pulses: Normal pulses.      Heart sounds: No murmur heard.  Pulmonary:      Effort: Pulmonary effort is normal.      Breath sounds: Normal breath sounds.   Musculoskeletal:         General: Tenderness present. No swelling.      Lumbar back: Spasms, tenderness and bony tenderness present. No swelling, deformity or lacerations. Positive left straight leg raise test. Negative right straight leg raise test.      Left hip: " "Systems  Constitutional, HEENT, cardiovascular, pulmonary, GI, , musculoskeletal, neuro, skin, endocrine and psych systems are negative, except as otherwise noted.      Objective    /72 (BP Location: Right arm, Patient Position: Sitting, Cuff Size: Adult Regular)   Pulse 88   Temp 98.3  F (36.8  C) (Oral)   Resp 18   Ht 1.628 m (5' 4.1\")   Wt 73.2 kg (161 lb 6.4 oz)   SpO2 97%   BMI 27.62 kg/m    Body mass index is 27.62 kg/m .  Physical Exam   GENERAL: alert and no distress  PSYCH: mentation appears normal, affect normal/bright            Signed Electronically by: Eleazar Wheeler MD    " Tenderness (anterior left hip and groin pain) and bony tenderness present. No deformity, lacerations or crepitus. Decreased range of motion.   Skin:     General: Skin is warm and dry.   Neurological:      General: No focal deficit present.      Mental Status: He is alert and oriented to person, place, and time.   Psychiatric:         Mood and Affect: Mood normal.         Judgment: Judgment normal.          Labs Reviewed:  Chemistry:  Lab Results   Component Value Date     07/14/2023    K 4.2 07/14/2023    CHLORIDE 103 07/14/2023    BUN 18.0 07/14/2023    CREATININE 1.04 07/14/2023    EGFRNORACEVR 84 07/14/2023    GLUCOSE 98 07/14/2023    CALCIUM 9.7 07/14/2023    ALKPHOS 107 07/14/2023    LABPROT 6.9 07/14/2023    ALBUMIN 4.4 07/14/2023    AST 52 07/14/2023    ALT 64 (H) 07/14/2023    TSH 3.720 07/14/2023    PSA 0.68 07/14/2023        Lab Results   Component Value Date    HGBA1C 5.7 07/14/2023        Hematology:  Lab Results   Component Value Date    WBC 5.98 07/14/2023    RBC 5.00 07/14/2023    HGB 15.7 07/14/2023    HCT 47.2 07/14/2023    MCV 94.4 07/14/2023    MCH 31.4 07/14/2023    MCHC 33.3 07/14/2023    RDW 13.0 07/14/2023     07/14/2023    MPV 10.0 07/14/2023    MONO 701 11/15/2022    MONO 7.7 11/15/2022    BASO 36 11/15/2022    EOSINOPHIL 1.5 11/15/2022    BASOPHIL 0.4 11/15/2022       Lipid Panel:  Lab Results   Component Value Date    CHOL 239 (H) 07/14/2023    HDL 41 07/14/2023    DLDL 152.6 (H) 07/14/2023    LDLCALC 110 (H) 11/15/2022    TRIG 227 (H) 07/14/2023        Assessment & Plan:  1. Acute left-sided low back pain with left-sided sciatica  -     X-Ray Lumbar Spine Ap And Lateral  -     naproxen (NAPROSYN) 500 MG tablet; Take 1 tablet (500 mg total) by mouth 2 (two) times daily with meals.  Dispense: 60 tablet; Refill: 1    2. Left hip pain  -     X-Ray Hip 2 or 3 views Left (with Pelvis when performed); Future; Expected date: 01/08/2024  -     naproxen (NAPROSYN) 500 MG tablet; Take 1  tablet (500 mg total) by mouth 2 (two) times daily with meals.  Dispense: 60 tablet; Refill: 1  -     ketorolac injection 60 mg  -     tiZANidine (ZANAFLEX) 4 MG tablet; Take 1 tablet (4 mg total) by mouth every 8 (eight) hours as needed (muscle pain).  Dispense: 20 tablet; Refill: 0    3. Positive depression screening  Comments:  I have reviewed the positive depression score which warrants active treatment with psychotherapy and/or medications.    4. Depressive disorder  Overview:  Increase venlafaxine to 75 mg daily    Orders:  -     venlafaxine (EFFEXOR-XR) 75 MG 24 hr capsule; Take 1 capsule (75 mg total) by mouth once daily.  Dispense: 90 capsule; Refill: 1    5. Plantar fasciitis  -     diclofenac sodium (VOLTAREN) 1 % Gel; Apply 2 g topically 4 (four) times daily.  Dispense: 100 g; Refill: 0         Future Appointments   Date Time Provider Department Center   1/8/2024 11:20 AM OA  XR1 ROOM A [facility] XRAY [facility]   1/16/2024  2:00 PM Enoc Lezama APRN [facility]   7/9/2024  8:30 AM LAB, [facility] LABORATORY DRAW STATION [facility]   7/16/2024  2:00 [facility]       Follow up for 1 month f/u, Depression/Anxiety. Call sooner if needed.    DEANNE RODRÍGUEZ    Lab Frequency Next Occurrence   Ambulatory referral/consult to Nephrology Once 12/26/2022   MANJIT Once 03/19/2023   X-Ray Hip 2 or 3 views Left (with Pelvis when performed) Once 01/08/2024        I have used clinical judgement based on duration and functional status to consider definite necessity for treatment.

## 2024-05-12 DIAGNOSIS — F41.9 ANXIETY: ICD-10-CM

## 2024-05-12 DIAGNOSIS — F32.A DEPRESSIVE DISORDER: ICD-10-CM

## 2024-05-13 RX ORDER — DULOXETIN HYDROCHLORIDE 30 MG/1
CAPSULE, DELAYED RELEASE ORAL
Qty: 180 CAPSULE | Refills: 1 | Status: SHIPPED | OUTPATIENT
Start: 2024-05-13

## 2024-07-04 DIAGNOSIS — R73.03 PREDIABETES: Chronic | ICD-10-CM

## 2024-07-05 RX ORDER — METFORMIN HYDROCHLORIDE 500 MG/1
500 TABLET, EXTENDED RELEASE ORAL
Qty: 90 TABLET | Refills: 3 | Status: SHIPPED | OUTPATIENT
Start: 2024-07-05

## 2024-07-15 ENCOUNTER — LAB VISIT (OUTPATIENT)
Dept: LAB | Facility: HOSPITAL | Age: 59
End: 2024-07-15
Attending: NURSE PRACTITIONER
Payer: COMMERCIAL

## 2024-07-15 ENCOUNTER — TELEPHONE (OUTPATIENT)
Dept: FAMILY MEDICINE | Facility: CLINIC | Age: 59
End: 2024-07-15
Payer: COMMERCIAL

## 2024-07-15 DIAGNOSIS — R73.03 PREDIABETES: Chronic | ICD-10-CM

## 2024-07-15 DIAGNOSIS — I10 ESSENTIAL HYPERTENSION: ICD-10-CM

## 2024-07-15 DIAGNOSIS — Z79.899 MEDICATION MANAGEMENT: ICD-10-CM

## 2024-07-15 DIAGNOSIS — Z00.00 WELLNESS EXAMINATION: ICD-10-CM

## 2024-07-15 DIAGNOSIS — E78.49 OTHER HYPERLIPIDEMIA: ICD-10-CM

## 2024-07-15 DIAGNOSIS — N18.2 CKD (CHRONIC KIDNEY DISEASE) STAGE 2, GFR 60-89 ML/MIN: ICD-10-CM

## 2024-07-15 LAB
ALBUMIN SERPL-MCNC: 4.2 G/DL (ref 3.4–5)
ALBUMIN/GLOB SERPL: 1.7 RATIO
ALP SERPL-CCNC: 111 UNIT/L (ref 50–144)
ALT SERPL-CCNC: 39 UNIT/L (ref 1–45)
ANION GAP SERPL CALC-SCNC: 4 MEQ/L (ref 2–13)
AST SERPL-CCNC: 34 UNIT/L (ref 17–59)
BASOPHILS # BLD AUTO: 0.05 X10(3)/MCL (ref 0.01–0.08)
BASOPHILS NFR BLD AUTO: 0.6 % (ref 0.1–1.2)
BILIRUB SERPL-MCNC: 0.8 MG/DL (ref 0–1)
BUN SERPL-MCNC: 16 MG/DL (ref 7–20)
CALCIUM SERPL-MCNC: 9.8 MG/DL (ref 8.4–10.2)
CHLORIDE SERPL-SCNC: 107 MMOL/L (ref 98–110)
CHOLEST SERPL-MCNC: 193 MG/DL (ref 0–200)
CO2 SERPL-SCNC: 25 MMOL/L (ref 21–32)
CREAT SERPL-MCNC: 1.06 MG/DL (ref 0.66–1.25)
CREAT/UREA NIT SERPL: 15 (ref 12–20)
EOSINOPHIL # BLD AUTO: 0.23 X10(3)/MCL (ref 0.04–0.54)
EOSINOPHIL NFR BLD AUTO: 2.6 % (ref 0.7–7)
ERYTHROCYTE [DISTWIDTH] IN BLOOD BY AUTOMATED COUNT: 12.5 %
EST. AVERAGE GLUCOSE BLD GHB EST-MCNC: 125.5 MG/DL (ref 70–115)
GFR SERPLBLD CREATININE-BSD FMLA CKD-EPI: 82 ML/MIN/1.73/M2
GLOBULIN SER-MCNC: 2.5 GM/DL (ref 2–3.9)
GLUCOSE SERPL-MCNC: 123 MG/DL (ref 70–115)
HBA1C MFR BLD: 6 % (ref 4–6)
HCT VFR BLD AUTO: 47.2 % (ref 36–52)
HDLC SERPL-MCNC: 47 MG/DL (ref 40–60)
HGB BLD-MCNC: 16 G/DL (ref 13–18)
IMM GRANULOCYTES # BLD AUTO: 0.02 X10(3)/MCL (ref 0–0.03)
IMM GRANULOCYTES NFR BLD AUTO: 0.2 % (ref 0–0.5)
LDLC SERPL DIRECT ASSAY-SCNC: 114.4 MG/DL (ref 30–100)
LYMPHOCYTES # BLD AUTO: 2.39 X10(3)/MCL (ref 1.32–3.57)
LYMPHOCYTES NFR BLD AUTO: 27.4 % (ref 20–55)
MCH RBC QN AUTO: 32.2 PG (ref 27–34)
MCHC RBC AUTO-ENTMCNC: 33.9 G/DL (ref 31–37)
MCV RBC AUTO: 95 FL (ref 79–99)
MONOCYTES # BLD AUTO: 0.72 X10(3)/MCL (ref 0.3–0.82)
MONOCYTES NFR BLD AUTO: 8.3 % (ref 4.7–12.5)
NEUTROPHILS # BLD AUTO: 5.3 X10(3)/MCL (ref 1.78–5.38)
NEUTROPHILS NFR BLD AUTO: 60.9 % (ref 37–73)
NRBC BLD AUTO-RTO: 0 %
PLATELET # BLD AUTO: 291 X10(3)/MCL (ref 140–371)
PMV BLD AUTO: 9 FL (ref 9.4–12.4)
POTASSIUM SERPL-SCNC: 4.4 MMOL/L (ref 3.5–5.1)
PROT SERPL-MCNC: 6.7 GM/DL (ref 6.3–8.2)
RBC # BLD AUTO: 4.97 X10(6)/MCL (ref 4–6)
SODIUM SERPL-SCNC: 136 MMOL/L (ref 136–145)
TRIGL SERPL-MCNC: 218 MG/DL (ref 30–200)
TSH SERPL-ACNC: 1.84 UIU/ML (ref 0.36–3.74)
WBC # BLD AUTO: 8.71 X10(3)/MCL (ref 4–11.5)

## 2024-07-15 PROCEDURE — 83036 HEMOGLOBIN GLYCOSYLATED A1C: CPT

## 2024-07-15 PROCEDURE — 80061 LIPID PANEL: CPT

## 2024-07-15 PROCEDURE — 36415 COLL VENOUS BLD VENIPUNCTURE: CPT

## 2024-07-15 PROCEDURE — 80053 COMPREHEN METABOLIC PANEL: CPT

## 2024-07-15 PROCEDURE — 84443 ASSAY THYROID STIM HORMONE: CPT

## 2024-07-15 PROCEDURE — 85025 COMPLETE CBC W/AUTO DIFF WBC: CPT

## 2024-08-05 ENCOUNTER — OFFICE VISIT (OUTPATIENT)
Dept: FAMILY MEDICINE | Facility: CLINIC | Age: 59
End: 2024-08-05
Payer: COMMERCIAL

## 2024-08-05 ENCOUNTER — PATIENT MESSAGE (OUTPATIENT)
Dept: FAMILY MEDICINE | Facility: CLINIC | Age: 59
End: 2024-08-05

## 2024-08-05 VITALS
SYSTOLIC BLOOD PRESSURE: 132 MMHG | HEART RATE: 86 BPM | DIASTOLIC BLOOD PRESSURE: 82 MMHG | WEIGHT: 240 LBS | BODY MASS INDEX: 37.67 KG/M2 | HEIGHT: 67 IN | TEMPERATURE: 98 F | OXYGEN SATURATION: 97 %

## 2024-08-05 DIAGNOSIS — R42 DIZZINESS: ICD-10-CM

## 2024-08-05 DIAGNOSIS — Z00.01 ABNORMAL WELLNESS EXAM: Primary | ICD-10-CM

## 2024-08-05 DIAGNOSIS — H72.92 EAR DRUM PERFORATION, LEFT: ICD-10-CM

## 2024-08-05 DIAGNOSIS — F41.9 ANXIETY: ICD-10-CM

## 2024-08-05 DIAGNOSIS — F33.42 RECURRENT MAJOR DEPRESSIVE DISORDER, IN FULL REMISSION: ICD-10-CM

## 2024-08-05 DIAGNOSIS — G25.2 INTENTION TREMOR: ICD-10-CM

## 2024-08-05 DIAGNOSIS — F32.A DEPRESSIVE DISORDER: ICD-10-CM

## 2024-08-05 DIAGNOSIS — Z12.5 SCREENING PSA (PROSTATE SPECIFIC ANTIGEN): ICD-10-CM

## 2024-08-05 DIAGNOSIS — I10 ESSENTIAL HYPERTENSION: Chronic | ICD-10-CM

## 2024-08-05 DIAGNOSIS — E78.49 OTHER HYPERLIPIDEMIA: ICD-10-CM

## 2024-08-05 DIAGNOSIS — R73.03 PREDIABETES: Chronic | ICD-10-CM

## 2024-08-05 DIAGNOSIS — M16.0 PRIMARY OSTEOARTHRITIS OF BOTH HIPS: ICD-10-CM

## 2024-08-05 DIAGNOSIS — R51.9 NONINTRACTABLE EPISODIC HEADACHE, UNSPECIFIED HEADACHE TYPE: ICD-10-CM

## 2024-08-05 DIAGNOSIS — E66.01 CLASS 2 SEVERE OBESITY DUE TO EXCESS CALORIES WITH SERIOUS COMORBIDITY AND BODY MASS INDEX (BMI) OF 37.0 TO 37.9 IN ADULT: Primary | ICD-10-CM

## 2024-08-05 DIAGNOSIS — N18.2 CKD (CHRONIC KIDNEY DISEASE) STAGE 2, GFR 60-89 ML/MIN: ICD-10-CM

## 2024-08-05 DIAGNOSIS — E66.01 CLASS 2 SEVERE OBESITY DUE TO EXCESS CALORIES WITH SERIOUS COMORBIDITY AND BODY MASS INDEX (BMI) OF 37.0 TO 37.9 IN ADULT: ICD-10-CM

## 2024-08-05 PROBLEM — J06.9 ACUTE UPPER RESPIRATORY INFECTION: Status: RESOLVED | Noted: 2024-04-09 | Resolved: 2024-08-05

## 2024-08-05 PROBLEM — R25.2 LEG CRAMPS: Status: RESOLVED | Noted: 2024-02-15 | Resolved: 2024-08-05

## 2024-08-05 LAB — PSA SERPL-MCNC: 0.84 NG/ML

## 2024-08-05 PROCEDURE — 99214 OFFICE O/P EST MOD 30 MIN: CPT | Mod: 25,,, | Performed by: NURSE PRACTITIONER

## 2024-08-05 PROCEDURE — 3008F BODY MASS INDEX DOCD: CPT | Mod: CPTII,,, | Performed by: NURSE PRACTITIONER

## 2024-08-05 PROCEDURE — 4010F ACE/ARB THERAPY RXD/TAKEN: CPT | Mod: CPTII,,, | Performed by: NURSE PRACTITIONER

## 2024-08-05 PROCEDURE — 3075F SYST BP GE 130 - 139MM HG: CPT | Mod: CPTII,,, | Performed by: NURSE PRACTITIONER

## 2024-08-05 PROCEDURE — 3079F DIAST BP 80-89 MM HG: CPT | Mod: CPTII,,, | Performed by: NURSE PRACTITIONER

## 2024-08-05 PROCEDURE — 3044F HG A1C LEVEL LT 7.0%: CPT | Mod: CPTII,,, | Performed by: NURSE PRACTITIONER

## 2024-08-05 PROCEDURE — 1160F RVW MEDS BY RX/DR IN RCRD: CPT | Mod: CPTII,,, | Performed by: NURSE PRACTITIONER

## 2024-08-05 PROCEDURE — 99396 PREV VISIT EST AGE 40-64: CPT | Mod: ,,, | Performed by: NURSE PRACTITIONER

## 2024-08-05 PROCEDURE — 1159F MED LIST DOCD IN RCRD: CPT | Mod: CPTII,,, | Performed by: NURSE PRACTITIONER

## 2024-08-05 PROCEDURE — 84153 ASSAY OF PSA TOTAL: CPT | Performed by: NURSE PRACTITIONER

## 2024-08-05 RX ORDER — DULOXETIN HYDROCHLORIDE 60 MG/1
60 CAPSULE, DELAYED RELEASE ORAL DAILY
Qty: 90 CAPSULE | Refills: 3 | Status: SHIPPED | OUTPATIENT
Start: 2024-08-05 | End: 2025-08-05

## 2024-08-05 RX ORDER — ATORVASTATIN CALCIUM 40 MG/1
40 TABLET, FILM COATED ORAL NIGHTLY
Qty: 90 TABLET | Refills: 3 | Status: SHIPPED | OUTPATIENT
Start: 2024-08-05

## 2024-08-06 ENCOUNTER — PATIENT MESSAGE (OUTPATIENT)
Dept: FAMILY MEDICINE | Facility: CLINIC | Age: 59
End: 2024-08-06
Payer: COMMERCIAL

## 2024-08-06 RX ORDER — SEMAGLUTIDE 0.25 MG/.5ML
0.25 INJECTION, SOLUTION SUBCUTANEOUS
Qty: 2 ML | Refills: 0 | Status: SHIPPED | OUTPATIENT
Start: 2024-08-06

## 2024-08-07 ENCOUNTER — PATIENT MESSAGE (OUTPATIENT)
Dept: FAMILY MEDICINE | Facility: CLINIC | Age: 59
End: 2024-08-07
Payer: COMMERCIAL

## 2024-08-08 ENCOUNTER — OFFICE VISIT (OUTPATIENT)
Dept: FAMILY MEDICINE | Facility: CLINIC | Age: 59
End: 2024-08-08
Payer: COMMERCIAL

## 2024-08-08 VITALS
DIASTOLIC BLOOD PRESSURE: 82 MMHG | SYSTOLIC BLOOD PRESSURE: 132 MMHG | HEART RATE: 80 BPM | HEIGHT: 67 IN | OXYGEN SATURATION: 99 % | WEIGHT: 239 LBS | TEMPERATURE: 97 F | BODY MASS INDEX: 37.51 KG/M2

## 2024-08-08 DIAGNOSIS — M16.0 PRIMARY OSTEOARTHRITIS OF BOTH HIPS: ICD-10-CM

## 2024-08-08 DIAGNOSIS — L02.422 FURUNCLE OF LEFT AXILLA: Primary | ICD-10-CM

## 2024-08-08 DIAGNOSIS — R51.9 BILATERAL HEADACHES: ICD-10-CM

## 2024-08-08 DIAGNOSIS — R09.81 NASAL CONGESTION: ICD-10-CM

## 2024-08-08 DIAGNOSIS — R11.2 NAUSEA AND VOMITING, UNSPECIFIED VOMITING TYPE: ICD-10-CM

## 2024-08-08 RX ORDER — DOXYCYCLINE 100 MG/1
100 CAPSULE ORAL EVERY 12 HOURS
Qty: 20 CAPSULE | Refills: 0 | Status: SHIPPED | OUTPATIENT
Start: 2024-08-08 | End: 2024-08-18

## 2024-08-08 RX ORDER — ONDANSETRON HYDROCHLORIDE 8 MG/1
8 TABLET, FILM COATED ORAL EVERY 12 HOURS PRN
Qty: 15 TABLET | Refills: 0 | Status: SHIPPED | OUTPATIENT
Start: 2024-08-08

## 2024-08-12 ENCOUNTER — TELEPHONE (OUTPATIENT)
Dept: FAMILY MEDICINE | Facility: CLINIC | Age: 59
End: 2024-08-12
Payer: COMMERCIAL

## 2024-08-12 ENCOUNTER — HOSPITAL ENCOUNTER (OUTPATIENT)
Dept: RADIOLOGY | Facility: HOSPITAL | Age: 59
Discharge: HOME | End: 2024-08-12
Attending: NURSE PRACTITIONER
Payer: COMMERCIAL

## 2024-08-12 DIAGNOSIS — R51.9 NONINTRACTABLE EPISODIC HEADACHE, UNSPECIFIED HEADACHE TYPE: ICD-10-CM

## 2024-08-12 DIAGNOSIS — G25.2 INTENTION TREMOR: ICD-10-CM

## 2024-08-12 DIAGNOSIS — R42 DIZZINESS: ICD-10-CM

## 2024-08-12 PROCEDURE — A9577 INJ MULTIHANCE: HCPCS | Performed by: NURSE PRACTITIONER

## 2024-08-12 PROCEDURE — 25500020 PHARM REV CODE 255: Performed by: NURSE PRACTITIONER

## 2024-08-12 PROCEDURE — 70553 MRI BRAIN STEM W/O & W/DYE: CPT | Mod: TC

## 2024-08-12 RX ADMIN — GADOBENATE DIMEGLUMINE 20 ML: 529 INJECTION, SOLUTION INTRAVENOUS at 01:08

## 2024-08-12 NOTE — PROGRESS NOTES
Notify patient that MRI does not show any masses, lesions or infarcts on brain.  Does show some tiny areas on both sides that are consistent with chronic ischemia, however, multiple sclerosis can show similar changes. Continue statin and aspirin medication for help prevent ischemia. If patient would like, can refer to neurology for further evaluation. General Pediatrics

## 2024-08-12 NOTE — TELEPHONE ENCOUNTER
I spoke with patient and discussed results. Patient will let us know if he would like neurology referral.

## 2024-08-13 ENCOUNTER — TELEPHONE (OUTPATIENT)
Dept: FAMILY MEDICINE | Facility: CLINIC | Age: 59
End: 2024-08-13
Payer: COMMERCIAL

## 2024-08-14 ENCOUNTER — PATIENT MESSAGE (OUTPATIENT)
Dept: FAMILY MEDICINE | Facility: CLINIC | Age: 59
End: 2024-08-14
Payer: COMMERCIAL

## 2024-08-21 ENCOUNTER — PATIENT MESSAGE (OUTPATIENT)
Dept: FAMILY MEDICINE | Facility: CLINIC | Age: 59
End: 2024-08-21
Payer: COMMERCIAL

## 2024-08-21 DIAGNOSIS — R51.9 NONINTRACTABLE EPISODIC HEADACHE, UNSPECIFIED HEADACHE TYPE: Primary | ICD-10-CM

## 2024-08-21 DIAGNOSIS — R90.89 ABNORMAL BRAIN MRI: ICD-10-CM

## 2024-08-21 RX ORDER — RIZATRIPTAN BENZOATE 5 MG/1
5 TABLET ORAL DAILY PRN
Qty: 12 TABLET | Refills: 1 | Status: SHIPPED | OUTPATIENT
Start: 2024-08-21 | End: 2024-09-20

## 2024-08-21 NOTE — ADDENDUM NOTE
Addended by: ROBYN BRUCE on: 8/21/2024 04:10 PM     Modules accepted: Orders     Time-based billing (NON-critical care)

## 2024-08-23 ENCOUNTER — HOSPITAL ENCOUNTER (EMERGENCY)
Facility: HOSPITAL | Age: 59
Discharge: HOME | End: 2024-08-23
Payer: COMMERCIAL

## 2024-08-23 ENCOUNTER — TELEPHONE (OUTPATIENT)
Dept: FAMILY MEDICINE | Facility: CLINIC | Age: 59
End: 2024-08-23
Payer: COMMERCIAL

## 2024-08-23 VITALS
DIASTOLIC BLOOD PRESSURE: 99 MMHG | RESPIRATION RATE: 17 BRPM | TEMPERATURE: 98 F | BODY MASS INDEX: 37.67 KG/M2 | HEART RATE: 77 BPM | SYSTOLIC BLOOD PRESSURE: 165 MMHG | HEIGHT: 67 IN | OXYGEN SATURATION: 98 % | WEIGHT: 240 LBS

## 2024-08-23 DIAGNOSIS — R51.9 NONINTRACTABLE HEADACHE, UNSPECIFIED CHRONICITY PATTERN, UNSPECIFIED HEADACHE TYPE: Primary | ICD-10-CM

## 2024-08-23 PROCEDURE — 96372 THER/PROPH/DIAG INJ SC/IM: CPT | Performed by: NURSE PRACTITIONER

## 2024-08-23 PROCEDURE — 25000003 PHARM REV CODE 250: Performed by: NURSE PRACTITIONER

## 2024-08-23 PROCEDURE — 99283 EMERGENCY DEPT VISIT LOW MDM: CPT | Mod: 25

## 2024-08-23 PROCEDURE — 63600175 PHARM REV CODE 636 W HCPCS: Performed by: NURSE PRACTITIONER

## 2024-08-23 RX ORDER — SUMATRIPTAN 6 MG/.5ML
6 INJECTION, SOLUTION SUBCUTANEOUS
Status: COMPLETED | OUTPATIENT
Start: 2024-08-23 | End: 2024-08-23

## 2024-08-23 RX ORDER — PROPRANOLOL HYDROCHLORIDE 20 MG/1
20 TABLET ORAL
Status: COMPLETED | OUTPATIENT
Start: 2024-08-23 | End: 2024-08-23

## 2024-08-23 RX ADMIN — PROPRANOLOL HYDROCHLORIDE 20 MG: 20 TABLET ORAL at 01:08

## 2024-08-23 RX ADMIN — SUMATRIPTAN 6 MG: 6 INJECTION, SOLUTION SUBCUTANEOUS at 12:08

## 2024-08-23 NOTE — DISCHARGE INSTRUCTIONS
Keep your scheduled appointments with neurology and ENT in order to continue your workup for headaches and vertigo   Please  your Imitrex prescription that was sent in earlier this week by your primary care provider

## 2024-08-23 NOTE — ED PROVIDER NOTES
Encounter Date: 8/23/2024       History     Chief Complaint   Patient presents with    Headache     Constant migraine headache for past month,  pt is being worked up by his pcp for this and had MRI 8/12/24 and awaiting neurologist and ENT referral,  started on wegovy a few weeks ago and now having nausea and dizziness.      This patient presents with complaints of intermittent headache for the past month after he experienced a ruptured tympanic membrane      Review of patient's allergies indicates:  No Known Allergies  Past Medical History:   Diagnosis Date    Depression     HTN (hypertension)     Hyperlipidemia     Kidney stones      Past Surgical History:   Procedure Laterality Date    CHOLECYSTECTOMY      KNEE SURGERY Right     SHOULDER SURGERY Left      Family History   Problem Relation Name Age of Onset    Lung cancer Mother      Cancer Father       Social History     Tobacco Use    Smoking status: Never    Smokeless tobacco: Never   Substance Use Topics    Alcohol use: Not Currently    Drug use: Never     Review of Systems   Neurological:  Positive for headaches.   All other systems reviewed and are negative.      Physical Exam     Initial Vitals [08/23/24 1143]   BP Pulse Resp Temp SpO2   (!) 148/106 83 17 97.8 °F (36.6 °C) 98 %      MAP       --         Physical Exam    Nursing note and vitals reviewed.  Constitutional: He appears well-developed and well-nourished.   HENT:   Head: Normocephalic and atraumatic.   Mouth/Throat: Mucous membranes are normal.   Eyes: Pupils are equal, round, and reactive to light.   Neck:   Normal range of motion.  Cardiovascular:  Normal rate, regular rhythm and normal heart sounds.           Pulmonary/Chest: Breath sounds normal.   Musculoskeletal:         General: Normal range of motion.      Cervical back: Normal range of motion.     Neurological: He is alert and oriented to person, place, and time.   Skin: Skin is warm and dry. Capillary refill takes less than 2 seconds.    Psychiatric: He has a normal mood and affect. His behavior is normal. Judgment and thought content normal.         ED Course   Procedures  Labs Reviewed - No data to display       Imaging Results    None          Medications   SUMAtriptan succinate injection 6 mg (6 mg Subcutaneous Given 8/23/24 1243)   propranoloL tablet 20 mg (20 mg Oral Given 8/23/24 1310)     Medical Decision Making  This patient presents with complaints of intermittent headache for the past month after he experienced a ruptured tympanic membrane  ER diagnosis-non intractable headache, unspecified chronicity pattern, unspecified headache type  This patient has been instructed to  his medication that was prescribed last week by his PCP for his headaches and take it as prescribed  He will be discharged home stable and follow up with his PCP.  If he experiences any increase in pain or other concerns he will return to the ER for further evaluation    Risk  Prescription drug management.                                      Clinical Impression:  Final diagnoses:  [R51.9] Nonintractable headache, unspecified chronicity pattern, unspecified headache type (Primary)          ED Disposition Condition    Discharge Stable          ED Prescriptions    None       Follow-up Information       Follow up With Specialties Details Why Contact Info    Enoc Lezama APRN Family Medicine Call  As needed [address]  [address]  [facility] [address]  [phone]               Lorin Carrion, GABE  08/23/24 2273

## 2024-08-26 ENCOUNTER — TELEPHONE (OUTPATIENT)
Dept: FAMILY MEDICINE | Facility: CLINIC | Age: 59
End: 2024-08-26
Payer: COMMERCIAL

## 2024-08-27 ENCOUNTER — OFFICE VISIT (OUTPATIENT)
Dept: FAMILY MEDICINE | Facility: CLINIC | Age: 59
End: 2024-08-27
Payer: COMMERCIAL

## 2024-08-27 VITALS
TEMPERATURE: 96 F | HEIGHT: 67 IN | DIASTOLIC BLOOD PRESSURE: 88 MMHG | SYSTOLIC BLOOD PRESSURE: 132 MMHG | BODY MASS INDEX: 36.31 KG/M2 | HEART RATE: 93 BPM | WEIGHT: 231.38 LBS | OXYGEN SATURATION: 98 %

## 2024-08-27 DIAGNOSIS — R42 DIZZINESS: ICD-10-CM

## 2024-08-27 DIAGNOSIS — E66.01 CLASS 2 SEVERE OBESITY DUE TO EXCESS CALORIES WITH SERIOUS COMORBIDITY AND BODY MASS INDEX (BMI) OF 37.0 TO 37.9 IN ADULT: ICD-10-CM

## 2024-08-27 DIAGNOSIS — M54.42 ACUTE LEFT-SIDED LOW BACK PAIN WITH LEFT-SIDED SCIATICA: ICD-10-CM

## 2024-08-27 DIAGNOSIS — H72.92 EAR DRUM PERFORATION, LEFT: ICD-10-CM

## 2024-08-27 DIAGNOSIS — R12 HEARTBURN: ICD-10-CM

## 2024-08-27 DIAGNOSIS — M25.552 LEFT HIP PAIN: ICD-10-CM

## 2024-08-27 DIAGNOSIS — I10 ESSENTIAL HYPERTENSION: Primary | Chronic | ICD-10-CM

## 2024-08-27 DIAGNOSIS — R51.9 NONINTRACTABLE HEADACHE, UNSPECIFIED CHRONICITY PATTERN, UNSPECIFIED HEADACHE TYPE: ICD-10-CM

## 2024-08-27 PROCEDURE — 1159F MED LIST DOCD IN RCRD: CPT | Mod: CPTII,,, | Performed by: NURSE PRACTITIONER

## 2024-08-27 PROCEDURE — 99214 OFFICE O/P EST MOD 30 MIN: CPT | Mod: ,,, | Performed by: NURSE PRACTITIONER

## 2024-08-27 PROCEDURE — 4010F ACE/ARB THERAPY RXD/TAKEN: CPT | Mod: CPTII,,, | Performed by: NURSE PRACTITIONER

## 2024-08-27 PROCEDURE — 3044F HG A1C LEVEL LT 7.0%: CPT | Mod: CPTII,,, | Performed by: NURSE PRACTITIONER

## 2024-08-27 PROCEDURE — 3079F DIAST BP 80-89 MM HG: CPT | Mod: CPTII,,, | Performed by: NURSE PRACTITIONER

## 2024-08-27 PROCEDURE — 3075F SYST BP GE 130 - 139MM HG: CPT | Mod: CPTII,,, | Performed by: NURSE PRACTITIONER

## 2024-08-27 PROCEDURE — 1160F RVW MEDS BY RX/DR IN RCRD: CPT | Mod: CPTII,,, | Performed by: NURSE PRACTITIONER

## 2024-08-27 PROCEDURE — 3008F BODY MASS INDEX DOCD: CPT | Mod: CPTII,,, | Performed by: NURSE PRACTITIONER

## 2024-08-27 RX ORDER — PANTOPRAZOLE SODIUM 20 MG/1
TABLET, DELAYED RELEASE ORAL
Qty: 90 TABLET | Refills: 0 | Status: SHIPPED | OUTPATIENT
Start: 2024-08-27

## 2024-08-27 RX ORDER — PROPRANOLOL HYDROCHLORIDE 60 MG/1
60 CAPSULE, EXTENDED RELEASE ORAL DAILY
Qty: 30 CAPSULE | Refills: 11 | Status: SHIPPED | OUTPATIENT
Start: 2024-08-27 | End: 2025-08-27

## 2024-08-27 RX ORDER — LISINOPRIL 5 MG/1
5 TABLET ORAL DAILY
Qty: 90 TABLET | Refills: 0 | Status: SHIPPED | OUTPATIENT
Start: 2024-08-27 | End: 2024-08-27 | Stop reason: CLARIF

## 2024-08-27 RX ORDER — NAPROXEN 500 MG/1
500 TABLET ORAL 2 TIMES DAILY WITH MEALS
Qty: 60 TABLET | Refills: 0 | Status: SHIPPED | OUTPATIENT
Start: 2024-08-27

## 2024-08-27 NOTE — ASSESSMENT & PLAN NOTE
Abnormal MRI, awaiting neurology apt, headache does improve with maxalt. Begin propranolol for elevated bp at home as well as headache prevention.

## 2024-08-27 NOTE — PROGRESS NOTES
Patient ID: Isaac Rodriguez  : 1965     Chief Complaint: Follow-up (ER)    Allergies: Patient has No Known Allergies.     History of Present Illness:  The patient is a 58 y.o. White male who presents to clinic for evaluation and management with a chief complaint of Follow-up (ER)   Pateint reports bad migraine on wed and Thursday. Patient took migraine medication which helps with headache.   Patient reports Thursday, he felt dizzy, felt off balance, got nauseous. Thought he was getting a migraine headache and nausea medication. Layed down and went back to sleep. Reports waking up at 11 still feeling bad, having balance issues. Took his blood pressure at home 160/90. Went to ER. Was given clonidine and monitored until blood pressure came down.     Patient has been monitoring blood pressure at home : 140s-160s/80s-100. Patient admits to ongoing dizziness. Has initial apt with ENT in 1 month for ruptured ear drum. Denies any ear drainage or ear pain. Patient reports boils resolved with doxycycline taken 2 weeks ago. Patient admits to intermittent palpitations but reports heart rate is always normal when he checks it. Denies chest pain, swelling, or SOB.          Past Medical History:  has a past medical history of Depression, HTN (hypertension), Hyperlipidemia, and Kidney stones.    Social History:  reports that he has never smoked. He has never used smokeless tobacco. He reports that he does not currently use alcohol. He reports that he does not use drugs.    Care Team: Patient Care Team:  Enoc Lezama APRN as PCP - General (Family Medicine)  Efrain Navas OD as Consulting Physician (Optometry)  Shyam Junior DPM (Podiatry)     Current Medications:  Current Outpatient Medications   Medication Instructions    atorvastatin (LIPITOR) 40 mg, Oral, Nightly    DULoxetine (CYMBALTA) 60 mg, Oral, Daily    metFORMIN (GLUCOPHAGE-XR) 500 mg, Oral    naproxen (NAPROSYN) 500 mg, Oral, 2 times daily with  "meals    ondansetron (ZOFRAN) 8 mg, Oral, Every 12 hours PRN    pantoprazole (PROTONIX) 20 MG tablet TAKE ONE TABLET BY MOUTH EVERY MORNING EMPTY STOMACH    propranoloL (INDERAL LA) 60 mg, Oral, Daily    rizatriptan (MAXALT) 5 mg, Oral, Daily PRN, May repeat dose after 2 hr if necessary    WEGOVY 0.25 mg, Subcutaneous, Every 7 days       Review of Systems   Constitutional:  Negative for fever.   HENT:  Positive for hearing loss and rhinorrhea. Negative for ear pain, sinus pressure/congestion, sore throat and tinnitus.    Eyes:  Negative for photophobia, pain and redness.   Respiratory:  Negative for cough.    Cardiovascular:  Positive for palpitations. Negative for chest pain and leg swelling.   Gastrointestinal:  Positive for nausea. Negative for abdominal pain.   Musculoskeletal:  Negative for back pain.   Neurological:  Positive for dizziness and headaches. Negative for seizures, weakness and numbness.        Visit Vitals  /88 (BP Location: Right arm, Patient Position: Sitting, BP Method: Medium (Manual))   Pulse 93   Temp 96.4 °F (35.8 °C) (Temporal)   Ht 5' 7.01" (1.702 m)   Wt 105 kg (231 lb 6.4 oz)   SpO2 98%   BMI 36.23 kg/m²       Physical Exam  Vitals and nursing note reviewed.   Constitutional:       Appearance: Normal appearance. He is obese.   HENT:      Head: Normocephalic and atraumatic.      Right Ear: No tenderness. Tympanic membrane is not injected, erythematous or bulging.      Left Ear: No tenderness. Tympanic membrane is perforated (central perforation without erythema, drainage, tenderness). Tympanic membrane is not injected.      Nose: Rhinorrhea present. No congestion. Rhinorrhea is clear.      Mouth/Throat:      Mouth: Mucous membranes are moist.      Pharynx: Oropharynx is clear. No oropharyngeal exudate or posterior oropharyngeal erythema.   Eyes:      Extraocular Movements: Extraocular movements intact.      Conjunctiva/sclera: Conjunctivae normal.      Comments: Wearing eyeglasses "   Cardiovascular:      Rate and Rhythm: Normal rate and regular rhythm.      Pulses: Normal pulses.      Heart sounds: No murmur heard.  Pulmonary:      Effort: Pulmonary effort is normal.      Breath sounds: Normal breath sounds.   Musculoskeletal:         General: No swelling.   Lymphadenopathy:      Cervical: No cervical adenopathy.   Skin:     General: Skin is warm and dry.      Findings: No lesion or rash.   Neurological:      General: No focal deficit present.      Mental Status: He is alert and oriented to person, place, and time.      Gait: Gait normal.   Psychiatric:         Mood and Affect: Mood normal.         Judgment: Judgment normal.          Labs Reviewed:  Chemistry:  Lab Results   Component Value Date     07/15/2024    K 4.4 07/15/2024    BUN 16 07/15/2024    CREATININE 1.06 07/15/2024    EGFRNORACEVR 82 07/15/2024    GLUCOSE 123 (H) 07/15/2024    CALCIUM 9.8 07/15/2024    ALKPHOS 111 07/15/2024    LABPROT 6.7 07/15/2024    ALBUMIN 4.2 07/15/2024    AST 34 07/15/2024    ALT 39 07/15/2024    MG 1.70 (L) 02/15/2024    TSH 1.840 07/15/2024    PSA 0.84 08/05/2024        Lab Results   Component Value Date    HGBA1C 6.0 07/15/2024        Hematology:  Lab Results   Component Value Date    WBC 8.71 07/15/2024    RBC 4.97 07/15/2024    HGB 16.0 07/15/2024    HCT 47.2 07/15/2024    MCV 95.0 07/15/2024    MCH 32.2 07/15/2024    MCHC 33.9 07/15/2024    RDW 12.5 07/15/2024     07/15/2024    MPV 9.0 (L) 07/15/2024    MONO 701 11/15/2022    MONO 7.7 11/15/2022    BASO 36 11/15/2022    EOSINOPHIL 1.5 11/15/2022    BASOPHIL 0.4 11/15/2022       Lipid Panel:  Lab Results   Component Value Date    CHOL 193 07/15/2024    HDL 47 07/15/2024    LDLCALC 110 (H) 11/15/2022    LDLDIRECT 114.4 (H) 07/15/2024    TRIG 218 (H) 07/15/2024        Assessment & Plan:  1. Essential hypertension  Overview:  Took olmesartan in the past, which was stopped d/t orthostatic hypotension. Home bps elevated, begin propranolol for  blood pressure, palpitations and headaches. and monitor bp closely, bring bp cuff to f/u apt    Orders:  -     Discontinue: lisinopriL (PRINIVIL,ZESTRIL) 5 MG tablet; Take 1 tablet (5 mg total) by mouth once daily.  Dispense: 90 tablet; Refill: 0  -     propranoloL (INDERAL LA) 60 MG 24 hr capsule; Take 1 capsule (60 mg total) by mouth once daily.  Dispense: 30 capsule; Refill: 11    2. Class 2 severe obesity due to excess calories with serious comorbidity and body mass index (BMI) of 37.0 to 37.9 in adult  Overview:  Advised to hold wegovy for now      3. Ear drum perforation, left  Overview:  Initial apt with Dr. Lerma on 9/30/24      4. Nonintractable headache, unspecified chronicity pattern, unspecified headache type  Assessment & Plan:  Abnormal MRI, awaiting neurology apt, headache does improve with maxalt. Begin propranolol for elevated bp at home as well as headache prevention.     Orders:  -     propranoloL (INDERAL LA) 60 MG 24 hr capsule; Take 1 capsule (60 mg total) by mouth once daily.  Dispense: 30 capsule; Refill: 11    5. Dizziness  Comments:  awaiting ENT and neurology apts. patient will consider cardiology referral in future, reports he already had workup         Future Appointments   Date Time Provider Department Center   10/7/2024  2:30 PM Enoc Lezama APRN [facility]   2/3/2025  8:50 AM LAB, [facility] LABORATORY DRAW STATION [facility]   2/10/2025  9:00 AM Enoc Lezama APRN [facility]       Follow up for 1month f/u htn, headaches, bring home bp cuff . Call sooner if needed.    DEANNE RODRÍGUEZ        Lab Frequency Next Occurrence   Ambulatory referral/consult to ENT Once 08/12/2024   Ambulatory referral/consult to Neurology Once 08/29/2024

## 2024-08-30 ENCOUNTER — PATIENT MESSAGE (OUTPATIENT)
Dept: FAMILY MEDICINE | Facility: CLINIC | Age: 59
End: 2024-08-30
Payer: COMMERCIAL

## 2024-08-30 ENCOUNTER — TELEPHONE (OUTPATIENT)
Dept: FAMILY MEDICINE | Facility: CLINIC | Age: 59
End: 2024-08-30
Payer: COMMERCIAL

## 2024-08-30 RX ORDER — MECLIZINE HCL 12.5 MG 12.5 MG/1
12.5 TABLET ORAL EVERY 8 HOURS PRN
Qty: 20 TABLET | Refills: 0 | Status: SHIPPED | OUTPATIENT
Start: 2024-08-30

## 2024-08-30 NOTE — TELEPHONE ENCOUNTER
Pt stated he is not having any other symptoms other than be nausea. He does not have a virus. He ate crackers this morning and they helped settle his stomach.

## 2024-08-30 NOTE — TELEPHONE ENCOUNTER
I have written for the following medications and/or orders for the patient.  Please notify the patient.      Medications Ordered This Encounter   Medications    meclizine (ANTIVERT) 12.5 mg tablet     Sig: Take 1 tablet (12.5 mg total) by mouth every 8 (eight) hours as needed for Dizziness or Nausea.     Dispense:  20 tablet     Refill:  0

## 2024-09-22 DIAGNOSIS — M25.552 LEFT HIP PAIN: ICD-10-CM

## 2024-09-22 DIAGNOSIS — M54.42 ACUTE LEFT-SIDED LOW BACK PAIN WITH LEFT-SIDED SCIATICA: ICD-10-CM

## 2024-09-23 RX ORDER — NAPROXEN 500 MG/1
500 TABLET ORAL 2 TIMES DAILY WITH MEALS
Qty: 60 TABLET | Refills: 0 | Status: SHIPPED | OUTPATIENT
Start: 2024-09-23

## 2024-10-07 ENCOUNTER — OFFICE VISIT (OUTPATIENT)
Dept: FAMILY MEDICINE | Facility: CLINIC | Age: 59
End: 2024-10-07
Payer: COMMERCIAL

## 2024-10-07 VITALS
HEART RATE: 83 BPM | SYSTOLIC BLOOD PRESSURE: 126 MMHG | DIASTOLIC BLOOD PRESSURE: 84 MMHG | WEIGHT: 233.19 LBS | HEIGHT: 67 IN | OXYGEN SATURATION: 98 % | TEMPERATURE: 98 F | BODY MASS INDEX: 36.6 KG/M2

## 2024-10-07 DIAGNOSIS — R42 VERTIGO: ICD-10-CM

## 2024-10-07 DIAGNOSIS — E66.812 CLASS 2 SEVERE OBESITY DUE TO EXCESS CALORIES WITH SERIOUS COMORBIDITY AND BODY MASS INDEX (BMI) OF 37.0 TO 37.9 IN ADULT: ICD-10-CM

## 2024-10-07 DIAGNOSIS — I10 ESSENTIAL HYPERTENSION: Primary | Chronic | ICD-10-CM

## 2024-10-07 DIAGNOSIS — Z23 IMMUNIZATION DUE: ICD-10-CM

## 2024-10-07 DIAGNOSIS — E66.01 CLASS 2 SEVERE OBESITY DUE TO EXCESS CALORIES WITH SERIOUS COMORBIDITY AND BODY MASS INDEX (BMI) OF 37.0 TO 37.9 IN ADULT: ICD-10-CM

## 2024-10-07 PROBLEM — H72.92 EAR DRUM PERFORATION, LEFT: Status: RESOLVED | Noted: 2024-02-15 | Resolved: 2024-10-07

## 2024-10-07 PROCEDURE — 1160F RVW MEDS BY RX/DR IN RCRD: CPT | Mod: CPTII,,, | Performed by: NURSE PRACTITIONER

## 2024-10-07 PROCEDURE — 3044F HG A1C LEVEL LT 7.0%: CPT | Mod: CPTII,,, | Performed by: NURSE PRACTITIONER

## 2024-10-07 PROCEDURE — 3079F DIAST BP 80-89 MM HG: CPT | Mod: CPTII,,, | Performed by: NURSE PRACTITIONER

## 2024-10-07 PROCEDURE — 3008F BODY MASS INDEX DOCD: CPT | Mod: CPTII,,, | Performed by: NURSE PRACTITIONER

## 2024-10-07 PROCEDURE — 99213 OFFICE O/P EST LOW 20 MIN: CPT | Mod: 25,,, | Performed by: NURSE PRACTITIONER

## 2024-10-07 PROCEDURE — 1159F MED LIST DOCD IN RCRD: CPT | Mod: CPTII,,, | Performed by: NURSE PRACTITIONER

## 2024-10-07 PROCEDURE — 3074F SYST BP LT 130 MM HG: CPT | Mod: CPTII,,, | Performed by: NURSE PRACTITIONER

## 2024-10-07 PROCEDURE — 90471 IMMUNIZATION ADMIN: CPT | Mod: ,,, | Performed by: NURSE PRACTITIONER

## 2024-10-07 PROCEDURE — 4010F ACE/ARB THERAPY RXD/TAKEN: CPT | Mod: CPTII,,, | Performed by: NURSE PRACTITIONER

## 2024-10-07 PROCEDURE — 90656 IIV3 VACC NO PRSV 0.5 ML IM: CPT | Mod: ,,, | Performed by: NURSE PRACTITIONER

## 2024-10-07 RX ORDER — LISINOPRIL 5 MG/1
5 TABLET ORAL
COMMUNITY
Start: 2024-08-27

## 2024-10-07 NOTE — PROGRESS NOTES
Patient ID: Isaac Rodriguez  : 1965     Chief Complaint: Hypertension (Follow up )    Allergies: Patient has No Known Allergies.     History of Present Illness:  The patient is a 59 y.o. White male who presents to clinic for evaluation and management with a chief complaint of Hypertension (Follow up )   Patient has been monitoring blood pressure at home : 140s-160s/80s-100. Patient admits to ongoing dizziness. Has initial apt with ENT in 1 month for ruptured ear drum. Denies any ear drainage or ear pain. Patient reports boils resolved with doxycycline taken 2 weeks ago. Patient admits to intermittent palpitations but reports heart rate is always normal when he checks it. Denies chest pain, swelling, or SOB.... Patient was started on propranolol at last visit.  Established care with ENT last week.  Diagnosed with vertigo, began therapy at [facility] in  today.  Reports dizziness, headaches much improved. Plans to attend PT twice weekly.     Past Medical History:  has a past medical history of Depression, HTN (hypertension), Hyperlipidemia, and Kidney stones.    Social History:  reports that he has never smoked. He has never used smokeless tobacco. He reports that he does not currently use alcohol. He reports that he does not use drugs.    Care Team: Patient Care Team:  Enoc Lezama APRN as PCP - General (Family Medicine)  Efrain Navas OD as Consulting Physician (Optometry)  Shyam Junior DPM (Podiatry)     Current Medications:  Current Outpatient Medications   Medication Instructions    atorvastatin (LIPITOR) 40 mg, Oral, Nightly    DULoxetine (CYMBALTA) 60 mg, Oral, Daily    lisinopriL (PRINIVIL,ZESTRIL) 5 mg    meclizine (ANTIVERT) 12.5 mg, Oral, Every 8 hours PRN    metFORMIN (GLUCOPHAGE-XR) 500 mg, Oral    naproxen (NAPROSYN) 500 mg, Oral, 2 times daily with meals    pantoprazole (PROTONIX) 20 MG tablet TAKE ONE TABLET BY MOUTH EVERY MORNING EMPTY STOMACH    propranoloL (INDERAL LA) 60  "mg, Oral, Daily    rizatriptan (MAXALT) 5 mg, Oral, Daily PRN, May repeat dose after 2 hr if necessary    WEGOVY 0.25 mg, Subcutaneous, Every 7 days       Review of Systems   Constitutional:  Negative for fever.   HENT:  Positive for hearing loss and rhinorrhea. Negative for ear pain, sinus pressure/congestion, sore throat and tinnitus.    Eyes:  Negative for photophobia, pain and redness.   Respiratory:  Negative for cough.    Cardiovascular:  Positive for palpitations. Negative for chest pain and leg swelling.   Gastrointestinal:  Positive for nausea. Negative for abdominal pain.   Musculoskeletal:  Negative for back pain.   Neurological:  Positive for dizziness and headaches. Negative for seizures, weakness and numbness.        Visit Vitals  /84 (BP Location: Right arm, Patient Position: Sitting)   Pulse 83   Temp 97.5 °F (36.4 °C) (Temporal)   Ht 5' 7.01" (1.702 m)   Wt 105.8 kg (233 lb 3.2 oz)   SpO2 98%   BMI 36.52 kg/m²       Physical Exam  Vitals and nursing note reviewed.   Constitutional:       Appearance: Normal appearance. He is obese.   HENT:      Head: Normocephalic and atraumatic.      Left Ear: Tympanic membrane is scarred. Tympanic membrane is not perforated or erythematous.      Nose: No congestion.      Mouth/Throat:      Mouth: Mucous membranes are moist.      Pharynx: Oropharynx is clear. No oropharyngeal exudate or posterior oropharyngeal erythema.   Eyes:      Extraocular Movements: Extraocular movements intact.      Conjunctiva/sclera: Conjunctivae normal.      Comments: Wearing eyeglasses   Cardiovascular:      Rate and Rhythm: Normal rate and regular rhythm.      Pulses: Normal pulses.      Heart sounds: No murmur heard.  Pulmonary:      Effort: Pulmonary effort is normal.      Breath sounds: Normal breath sounds.   Musculoskeletal:         General: No swelling.   Lymphadenopathy:      Cervical: No cervical adenopathy.   Skin:     General: Skin is warm and dry.      Findings: No " lesion or rash.   Neurological:      General: No focal deficit present.      Mental Status: He is alert and oriented to person, place, and time.      Gait: Gait normal.   Psychiatric:         Mood and Affect: Mood normal.         Judgment: Judgment normal.          Labs Reviewed:  Chemistry:  Lab Results   Component Value Date     07/15/2024    K 4.4 07/15/2024    BUN 16 07/15/2024    CREATININE 1.06 07/15/2024    EGFRNORACEVR 82 07/15/2024    GLUCOSE 123 (H) 07/15/2024    CALCIUM 9.8 07/15/2024    ALKPHOS 111 07/15/2024    LABPROT 6.7 07/15/2024    ALBUMIN 4.2 07/15/2024    AST 34 07/15/2024    ALT 39 07/15/2024    MG 1.70 (L) 02/15/2024    TSH 1.840 07/15/2024    PSA 0.84 08/05/2024        Lab Results   Component Value Date    HGBA1C 6.0 07/15/2024        Hematology:  Lab Results   Component Value Date    WBC 8.71 07/15/2024    RBC 4.97 07/15/2024    HGB 16.0 07/15/2024    HCT 47.2 07/15/2024    MCV 95.0 07/15/2024    MCH 32.2 07/15/2024    MCHC 33.9 07/15/2024    RDW 12.5 07/15/2024     07/15/2024    MPV 9.0 (L) 07/15/2024    MONO 701 11/15/2022    MONO 7.7 11/15/2022    BASO 36 11/15/2022    EOSINOPHIL 1.5 11/15/2022    BASOPHIL 0.4 11/15/2022       Lipid Panel:  Lab Results   Component Value Date    CHOL 193 07/15/2024    HDL 47 07/15/2024    LDLCALC 110 (H) 11/15/2022    LDLDIRECT 114.4 (H) 07/15/2024    TRIG 218 (H) 07/15/2024        Assessment & Plan:  1. Essential hypertension  Overview:  Olmesartan caused orthostatic hypotension. Home bps elevated, tolerating propranolol for blood pressure, palpitations and headaches.      2. Vertigo  Overview:  Established care with ENT, attending therapy at [facility].       3. Class 2 severe obesity due to excess calories with serious comorbidity and body mass index (BMI) of 37.0 to 37.9 in adult  Overview:  Holding wegovy did not alleviate symptoms, restart per patient. Has new rx at home. If tolerates well, patient will notify for refill of higher  dose      4. Immunization due  -     influenza (Flulaval, Fluzone, Fluarix) 45 mcg/0.5 mL IM vaccine (> or = 6 mo) 0.5 mL         Future Appointments   Date Time Provider Department Center   12/16/2024  1:00 PM Stefania Dodd MD [facility]   2/3/2025  8:50 AM LAB, [facility] LABORATORY DRAW STATION [facility] BRA[facility] Jhaveri   2/10/2025  9:00 AM Enoc Lezama APRN [facility]       Follow up if symptoms worsen or fail to improve, for Keep Apt as Scheduled. Call sooner if needed.    DEANNE RODRÍGUEZ        Lab Frequency Next Occurrence   Ambulatory referral/consult to ENT Once 08/12/2024   Ambulatory referral/consult to Neurology Once 08/29/2024

## 2024-11-22 DIAGNOSIS — I10 ESSENTIAL (PRIMARY) HYPERTENSION: ICD-10-CM

## 2024-11-22 RX ORDER — LISINOPRIL 5 MG/1
5 TABLET ORAL
Qty: 90 TABLET | Refills: 0 | Status: SHIPPED | OUTPATIENT
Start: 2024-11-22

## 2025-02-04 ENCOUNTER — TELEPHONE (OUTPATIENT)
Dept: FAMILY MEDICINE | Facility: CLINIC | Age: 60
End: 2025-02-04
Payer: COMMERCIAL

## 2025-02-04 NOTE — TELEPHONE ENCOUNTER
Patient verbalized understanding and states he can have his labs down on Monday when he comes in for his follow up. He states that is the only day he is off.

## 2025-02-04 NOTE — TELEPHONE ENCOUNTER
----- Message from DEANNE Cortez sent at 2/4/2025 12:16 PM CST -----  Please notify patient of following results:     Kidney function increased a little. Please encourage patient to hydrate and can repeat lab at his apt next week

## 2025-02-10 ENCOUNTER — OFFICE VISIT (OUTPATIENT)
Dept: FAMILY MEDICINE | Facility: CLINIC | Age: 60
End: 2025-02-10
Payer: COMMERCIAL

## 2025-02-10 VITALS
HEART RATE: 62 BPM | BODY MASS INDEX: 38.71 KG/M2 | TEMPERATURE: 97 F | WEIGHT: 246.63 LBS | DIASTOLIC BLOOD PRESSURE: 76 MMHG | SYSTOLIC BLOOD PRESSURE: 138 MMHG | OXYGEN SATURATION: 98 % | HEIGHT: 67 IN

## 2025-02-10 DIAGNOSIS — I10 ESSENTIAL HYPERTENSION: Primary | Chronic | ICD-10-CM

## 2025-02-10 DIAGNOSIS — E78.49 OTHER HYPERLIPIDEMIA: ICD-10-CM

## 2025-02-10 DIAGNOSIS — F41.9 ANXIETY: ICD-10-CM

## 2025-02-10 DIAGNOSIS — G44.209 ACUTE NON INTRACTABLE TENSION-TYPE HEADACHE: ICD-10-CM

## 2025-02-10 DIAGNOSIS — N18.2 CKD (CHRONIC KIDNEY DISEASE) STAGE 2, GFR 60-89 ML/MIN: ICD-10-CM

## 2025-02-10 DIAGNOSIS — E66.01 CLASS 2 SEVERE OBESITY DUE TO EXCESS CALORIES WITH SERIOUS COMORBIDITY AND BODY MASS INDEX (BMI) OF 38.0 TO 38.9 IN ADULT: ICD-10-CM

## 2025-02-10 DIAGNOSIS — Z13.31 POSITIVE DEPRESSION SCREENING: ICD-10-CM

## 2025-02-10 DIAGNOSIS — R73.03 PREDIABETES: Chronic | ICD-10-CM

## 2025-02-10 DIAGNOSIS — F33.1 MODERATE EPISODE OF RECURRENT MAJOR DEPRESSIVE DISORDER: ICD-10-CM

## 2025-02-10 DIAGNOSIS — E66.812 CLASS 2 SEVERE OBESITY DUE TO EXCESS CALORIES WITH SERIOUS COMORBIDITY AND BODY MASS INDEX (BMI) OF 38.0 TO 38.9 IN ADULT: ICD-10-CM

## 2025-02-10 PROBLEM — F33.9 EPISODE OF RECURRENT MAJOR DEPRESSIVE DISORDER: Status: ACTIVE | Noted: 2024-08-05

## 2025-02-10 PROCEDURE — 3008F BODY MASS INDEX DOCD: CPT | Mod: CPTII,,, | Performed by: NURSE PRACTITIONER

## 2025-02-10 PROCEDURE — 3075F SYST BP GE 130 - 139MM HG: CPT | Mod: CPTII,,, | Performed by: NURSE PRACTITIONER

## 2025-02-10 PROCEDURE — 3078F DIAST BP <80 MM HG: CPT | Mod: CPTII,,, | Performed by: NURSE PRACTITIONER

## 2025-02-10 PROCEDURE — 3044F HG A1C LEVEL LT 7.0%: CPT | Mod: CPTII,,, | Performed by: NURSE PRACTITIONER

## 2025-02-10 PROCEDURE — 4010F ACE/ARB THERAPY RXD/TAKEN: CPT | Mod: CPTII,,, | Performed by: NURSE PRACTITIONER

## 2025-02-10 PROCEDURE — 99214 OFFICE O/P EST MOD 30 MIN: CPT | Mod: ,,, | Performed by: NURSE PRACTITIONER

## 2025-02-10 PROCEDURE — 1160F RVW MEDS BY RX/DR IN RCRD: CPT | Mod: CPTII,,, | Performed by: NURSE PRACTITIONER

## 2025-02-10 PROCEDURE — 1159F MED LIST DOCD IN RCRD: CPT | Mod: CPTII,,, | Performed by: NURSE PRACTITIONER

## 2025-02-10 RX ORDER — SEMAGLUTIDE 0.5 MG/.5ML
0.5 INJECTION, SOLUTION SUBCUTANEOUS
Qty: 2 ML | Refills: 1 | Status: SHIPPED | OUTPATIENT
Start: 2025-02-10

## 2025-02-10 RX ORDER — RIZATRIPTAN BENZOATE 5 MG/1
5 TABLET ORAL DAILY PRN
Qty: 12 TABLET | Refills: 1 | Status: SHIPPED | OUTPATIENT
Start: 2025-02-10 | End: 2025-03-12

## 2025-02-10 NOTE — ASSESSMENT & PLAN NOTE
Lipid Panel:  Lab Results   Component Value Date    CHOL 220 (H) 02/03/2025    HDL 37 (L) 02/03/2025    LDLCALC 129 (H) 02/03/2025    LDLDIRECT 114.4 (H) 07/15/2024    CHOLHDL 4.5 11/15/2022    TRIG 301 (H) 02/03/2025    AST 19 02/03/2025    ALT 26 02/03/2025    ALKPHOS 111 07/15/2024    ALKPHOSP 119 02/03/2025    LABPROT 6.7 07/15/2024    ALBUMIN 4.0 02/03/2025

## 2025-02-10 NOTE — PROGRESS NOTES
Patient ID: Isaac Rodriguez  : 1965    Chief Complaint: Diabetes (6 month follow up)    Allergies: Patient has No Known Allergies.     Subjective :  The patient is a 59 y.o. White male who presents to clinic for follow up on Diabetes (6 month follow up)     History of Present Illness    HPI:  Patient discontinued all medications since October due to financial difficulties but restarted them approximately 1 week ago, including atorvastatin, Cymbalta, lisinopril, and metformin. He notes improvement with Cymbalta for chronic musculoskeletal pain, depression, and anxiety.    Patient has severe hip pain, affecting his ability to lift his leg. An x-ray from over a year ago revealed hip arthritis. He switched from naproxen to extra strength Tylenol for pain management due to concerns about kidney function.    Regarding migraines, the patient reports reduced frequency. He had a headache 2 weeks ago, which responded to his migraine medication. He notes a decrease in headaches since November, coinciding with the initiation of physical therapy for balance issues. Prior to this, he had daily headaches for 8-9 months.    Patient reports depression related to recent life changes, including the loss of his partner and challenges with living arrangements and custody of his children.    Patient denies any current headaches or migraines.    MEDICATIONS:  Patient is on Atorvastatin for cholesterol, Cymbalta for depression, anxiety, and chronic musculoskeletal pain, and a low dose of Lisinopril for blood pressure and kidney protection. He is also taking Metformin and Wegovy 0.25 mg weekly with 3 doses remaining. Patient is on Propranolol for migraine prevention, blood pressure, and heart rate control, as well as Protonix. He is taking some migraine medication and extra strength Tylenol as needed for hip pain.    MEDICAL HISTORY:  Patient has a history of depression, anxiety, hyperlipidemia, hypertension, and prediabetes. He  "also has osteoarthritis in his hip and spine, and experiences migraines.    TEST RESULTS:  Patient's recent A1C was 5.6. His kidney function has recently decreased from stage 2 to stage 3. His recent cholesterol levels were elevated.    IMAGING:  Patient had a hip X-ray in January 2024, which showed arthritis in the hip. He also underwent a brain MRI.      ROS:  ROS as indicated in HPI.           Social History:  reports that he has never smoked. He has never used smokeless tobacco. He reports that he does not currently use alcohol. He reports that he does not use drugs.    Past Medical History:  has a past medical history of Depression, HTN (hypertension), Hyperlipidemia, and Kidney stones.    Care Team: Patient Care Team:  Enoc Lezama APRN as PCP - General (Family Medicine)  Efrain Navas OD as Consulting Physician (Optometry)  Shyam Junior DPM (Podiatry)     Current Medications:  Current Outpatient Medications   Medication Instructions    atorvastatin (LIPITOR) 40 mg, Oral, Nightly    DULoxetine (CYMBALTA) 60 mg, Oral, Daily    lisinopriL (PRINIVIL,ZESTRIL) 5 mg, Oral    meclizine (ANTIVERT) 12.5 mg, Oral, Every 8 hours PRN    naproxen (NAPROSYN) 500 mg, Oral, 2 times daily with meals    pantoprazole (PROTONIX) 20 MG tablet TAKE ONE TABLET BY MOUTH EVERY MORNING EMPTY STOMACH    propranoloL (INDERAL LA) 60 mg, Oral, Daily    rizatriptan (MAXALT) 5 mg, Oral, Daily PRN, May repeat dose after 2 hr if necessary    WEGOVY 0.5 mg, Subcutaneous, Every 7 days         Visit Vitals  /76 (BP Location: Right arm, Patient Position: Sitting)   Pulse 62   Temp 97.2 °F (36.2 °C) (Temporal)   Ht 5' 7.01" (1.702 m)   Wt 111.9 kg (246 lb 9.6 oz)   SpO2 98%   BMI 38.61 kg/m²       Physical Exam  Vitals and nursing note reviewed.   Constitutional:       Appearance: Normal appearance. He is obese.   HENT:      Head: Normocephalic and atraumatic.      Nose: No congestion.      Mouth/Throat:      Mouth: " Mucous membranes are moist.      Pharynx: Oropharynx is clear. No oropharyngeal exudate or posterior oropharyngeal erythema.   Eyes:      Extraocular Movements: Extraocular movements intact.      Conjunctiva/sclera: Conjunctivae normal.      Comments: Wearing eyeglasses   Cardiovascular:      Rate and Rhythm: Normal rate and regular rhythm.      Pulses: Normal pulses.      Heart sounds: No murmur heard.  Pulmonary:      Effort: Pulmonary effort is normal.      Breath sounds: Normal breath sounds.   Musculoskeletal:         General: No swelling.      Right lower leg: No edema.      Left lower leg: No edema.   Lymphadenopathy:      Cervical: No cervical adenopathy.   Skin:     General: Skin is warm and dry.      Findings: No lesion or rash.   Neurological:      General: No focal deficit present.      Mental Status: He is alert and oriented to person, place, and time.      Gait: Gait normal.   Psychiatric:         Mood and Affect: Mood normal.         Judgment: Judgment normal.        Labs Reviewed:  Chemistry:  Lab Results   Component Value Date     02/03/2025    K 4.6 02/03/2025    BUN 24 02/03/2025    CREATININE 1.45 (H) 02/03/2025    EGFRNORACEVR 56 (L) 02/03/2025    GLUCOSE 123 (H) 07/15/2024    CALCIUM 9.8 02/03/2025    ALKPHOS 111 07/15/2024    LABPROT 6.7 07/15/2024    ALBUMIN 4.0 02/03/2025    AST 19 02/03/2025    ALT 26 02/03/2025    MG 1.70 (L) 02/15/2024    TSH 1.840 07/15/2024    PSA 0.84 08/05/2024        Lab Results   Component Value Date    HGBA1C 5.6 02/03/2025        Hematology:  Lab Results   Component Value Date    WBC 6.9 02/03/2025    RBC 4.58 02/03/2025    HGB 14.4 02/03/2025    HCT 45.1 02/03/2025    MCV 99 (H) 02/03/2025    MCH 31.4 02/03/2025    MCHC 31.9 02/03/2025    RDW 12.5 02/03/2025     02/03/2025    MPV 9.0 (L) 07/15/2024    MONO 12 02/03/2025    MONO 0.8 02/03/2025    BASO 0.1 02/03/2025    EOSINOPHIL 4 02/03/2025    BASOPHIL 1 02/03/2025       Lipid Panel:  Lab Results    Component Value Date    CHOL 220 (H) 02/03/2025    HDL 37 (L) 02/03/2025    LDLCALC 129 (H) 02/03/2025    LDLDIRECT 114.4 (H) 07/15/2024    TRIG 301 (H) 02/03/2025        Diagnosis:  1. Essential hypertension  Overview:  Olmesartan caused orthostatic hypotension. Home bps elevated, tolerating propranolol for blood pressure, palpitations and headaches.      2. Moderate episode of recurrent major depressive disorder  Overview:  Patinet just restarted cymbalta last week, continue      3. Prediabetes  Overview:  Stop metformin since restarting wegovy    Assessment & Plan:  Diabetes labs:   Lab Results   Component Value Date    HGBA1C 5.6 02/03/2025            4. Other hyperlipidemia  Overview:  restarted atorvastatin 40mg last week    Assessment & Plan:  Lipid Panel:  Lab Results   Component Value Date    CHOL 220 (H) 02/03/2025    HDL 37 (L) 02/03/2025    LDLCALC 129 (H) 02/03/2025    LDLDIRECT 114.4 (H) 07/15/2024    CHOLHDL 4.5 11/15/2022    TRIG 301 (H) 02/03/2025    AST 19 02/03/2025    ALT 26 02/03/2025    ALKPHOS 111 07/15/2024    ALKPHOSP 119 02/03/2025    LABPROT 6.7 07/15/2024    ALBUMIN 4.0 02/03/2025          5. CKD (chronic kidney disease) stage 2, GFR 60-89 ml/min  Overview:  Declined back to stage 3a, patient has been hydrating, patient restarted wegovy, stop metformin, advised to limit nsaid use. will repeat level next month    Orders:  -     Basic Metabolic Panel; Future; Expected date: 03/10/2025    6. Anxiety  Overview:  continue cymbalta      7. Class 2 severe obesity due to excess calories with serious comorbidity and body mass index (BMI) of 38.0 to 38.9 in adult  Overview:  Taking wegovy, discussed titration    Orders:  -     semaglutide, weight loss, (WEGOVY) 0.5 mg/0.5 mL PnIj; Inject 0.5 mg into the skin every 7 days.  Dispense: 2 mL; Refill: 1    8. Positive depression screening  Comments:  I have reviewed the positive depression score which warrants active treatment with psychotherapy  and/or medications.    9. Acute non intractable tension-type headache  Overview:  Takes maxalt PRN, using rarely    Orders:  -     rizatriptan (MAXALT) 5 MG tablet; Take 1 tablet (5 mg total) by mouth daily as needed for Migraine. May repeat dose after 2 hr if necessary  Dispense: 12 tablet; Refill: 1         Assessment & Plan    F33.1 Moderate episode of recurrent major depressive disorder  I10 Essential hypertension  R73.03 Prediabetes  E78.49 Other hyperlipidemia  N18.2 CKD (chronic kidney disease) stage 2, GFR 60-89 ml/min  F41.9 Anxiety  E66.812, E66.01, Z68.38 Class 2 severe obesity due to excess calories with serious comorbidity and body mass index (BMI) of 38.0 to 38.9 in adult  Z13.31 Positive depression screening  G44.209 Acute non intractable tension-type headache    IMPRESSION:  - Assessed medication adherence and recent lab results  - Discontinued metformin due to improved A1C (5.6) and concerns about kidney function  - Considered impact of naproxen use on kidney function  - Evaluated depression management with recently restarted Cymbalta  - Reviewed hip pain management options  - Assessed migraine frequency and current prophylaxis    F33.1 MODERATE EPISODE OF RECURRENT MAJOR DEPRESSIVE DISORDER:  - Continued Cymbalta, which was recently restarted by the patient.  - Explained that it takes several weeks to build up in the system for full effect.    I10 ESSENTIAL HYPERTENSION:  - Continued lisinopril for hypertension management.    R73.03 PREDIABETES:  - Discontinued metformin for prediabetes management.    E78.49 OTHER HYPERLIPIDEMIA:  - Continued atorvastatin for hyperlipidemia treatment.    N18.2 CKD (CHRONIC KIDNEY DISEASE) STAGE 2, GFR 60-89 ML/MIN:  - Ordered labs to assess kidney function before the next visit.    E66.812, E66.01, Z68.38 CLASS 2 SEVERE OBESITY DUE TO EXCESS CALORIES WITH SERIOUS COMORBIDITY AND BODY MASS INDEX (BMI) OF 38.0 TO 38.9 IN ADULT:  - Initiated Wegovy 0.25 mg weekly for  4 weeks for obesity management.  - Instructed the patient to increase the dose to 0.5 mg weekly after the initial 4-week period.    G44.209 ACUTE NON INTRACTABLE TENSION-TYPE HEADACHE:  - Continued propranolol for migraine prevention.  - Maintained the current as-needed migraine medication regimen.         Future Appointments   Date Time Provider Department Center   3/24/2025 10:00 AM LAB, [facility] LABORATORY DRAW STATION [facility]   3/31/2025  1:00 PM Enoc Lezama APRN [facility]   8/18/2025  9:00 AM LAB, [facility] LABORATORY DRAW STATION [facility]   8/25/2025  2:30 PM [facility]       Follow up for 1) after 8/5/25 , Wellness, Fasting labs prior 2) 4-6 wk f/u depression, kidneys, nonfast labs prior. Call sooner if needed.    DEANNE RODRÍGUEZ    Lab Frequency Next Occurrence   Ambulatory referral/consult to ENT Once 08/12/2024   Ambulatory referral/consult to Neurology Once 08/29/2024            This note was generated with the assistance of ambient listening technology. Verbal consent was obtained by the patient and accompanying visitor(s) for the recording of patient appointment to facilitate this note. I attest to having reviewed and edited the generated note for accuracy, though some syntax or spelling errors may persist. Please contact the author of this note for any clarification.  I have reviewed the positive depression score which warrants active treatment with psychotherapy and/or medications.

## 2025-02-11 DIAGNOSIS — M54.42 ACUTE LEFT-SIDED LOW BACK PAIN WITH LEFT-SIDED SCIATICA: ICD-10-CM

## 2025-02-11 DIAGNOSIS — M25.552 LEFT HIP PAIN: ICD-10-CM

## 2025-02-11 RX ORDER — NAPROXEN 500 MG/1
500 TABLET ORAL 2 TIMES DAILY WITH MEALS
Qty: 60 TABLET | Refills: 0 | Status: SHIPPED | OUTPATIENT
Start: 2025-02-11

## 2025-03-02 DIAGNOSIS — I10 ESSENTIAL (PRIMARY) HYPERTENSION: ICD-10-CM

## 2025-03-03 RX ORDER — LISINOPRIL 5 MG/1
5 TABLET ORAL
Qty: 90 TABLET | Refills: 0 | Status: SHIPPED | OUTPATIENT
Start: 2025-03-03

## 2025-03-16 DIAGNOSIS — M54.42 ACUTE LEFT-SIDED LOW BACK PAIN WITH LEFT-SIDED SCIATICA: ICD-10-CM

## 2025-03-16 DIAGNOSIS — M25.552 LEFT HIP PAIN: ICD-10-CM

## 2025-03-17 RX ORDER — NAPROXEN 500 MG/1
500 TABLET ORAL 2 TIMES DAILY WITH MEALS
Qty: 60 TABLET | Refills: 0 | Status: SHIPPED | OUTPATIENT
Start: 2025-03-17

## 2025-03-21 ENCOUNTER — PATIENT MESSAGE (OUTPATIENT)
Dept: FAMILY MEDICINE | Facility: CLINIC | Age: 60
End: 2025-03-21
Payer: COMMERCIAL

## 2025-03-27 ENCOUNTER — RESULTS FOLLOW-UP (OUTPATIENT)
Dept: FAMILY MEDICINE | Facility: CLINIC | Age: 60
End: 2025-03-27

## 2025-03-31 ENCOUNTER — OFFICE VISIT (OUTPATIENT)
Dept: FAMILY MEDICINE | Facility: CLINIC | Age: 60
End: 2025-03-31
Payer: COMMERCIAL

## 2025-03-31 VITALS
WEIGHT: 240.63 LBS | HEIGHT: 67 IN | BODY MASS INDEX: 37.77 KG/M2 | DIASTOLIC BLOOD PRESSURE: 88 MMHG | HEART RATE: 95 BPM | SYSTOLIC BLOOD PRESSURE: 138 MMHG | TEMPERATURE: 98 F | OXYGEN SATURATION: 99 %

## 2025-03-31 DIAGNOSIS — R20.8 BURNING SENSATION OF FEET: Primary | ICD-10-CM

## 2025-03-31 DIAGNOSIS — Z13.31 POSITIVE DEPRESSION SCREENING: ICD-10-CM

## 2025-03-31 DIAGNOSIS — I10 ESSENTIAL HYPERTENSION: Chronic | ICD-10-CM

## 2025-03-31 DIAGNOSIS — R20.2 NUMBNESS AND TINGLING IN BOTH HANDS: ICD-10-CM

## 2025-03-31 DIAGNOSIS — N18.2 CKD (CHRONIC KIDNEY DISEASE) STAGE 2, GFR 60-89 ML/MIN: ICD-10-CM

## 2025-03-31 DIAGNOSIS — R73.03 PREDIABETES: Chronic | ICD-10-CM

## 2025-03-31 DIAGNOSIS — M16.0 PRIMARY OSTEOARTHRITIS OF BOTH HIPS: ICD-10-CM

## 2025-03-31 DIAGNOSIS — R20.0 NUMBNESS AND TINGLING IN BOTH HANDS: ICD-10-CM

## 2025-03-31 DIAGNOSIS — F33.1 MODERATE EPISODE OF RECURRENT MAJOR DEPRESSIVE DISORDER: ICD-10-CM

## 2025-03-31 PROCEDURE — 3075F SYST BP GE 130 - 139MM HG: CPT | Mod: CPTII,,, | Performed by: NURSE PRACTITIONER

## 2025-03-31 PROCEDURE — 3044F HG A1C LEVEL LT 7.0%: CPT | Mod: CPTII,,, | Performed by: NURSE PRACTITIONER

## 2025-03-31 PROCEDURE — 4010F ACE/ARB THERAPY RXD/TAKEN: CPT | Mod: CPTII,,, | Performed by: NURSE PRACTITIONER

## 2025-03-31 PROCEDURE — 1160F RVW MEDS BY RX/DR IN RCRD: CPT | Mod: CPTII,,, | Performed by: NURSE PRACTITIONER

## 2025-03-31 PROCEDURE — 3079F DIAST BP 80-89 MM HG: CPT | Mod: CPTII,,, | Performed by: NURSE PRACTITIONER

## 2025-03-31 PROCEDURE — 3008F BODY MASS INDEX DOCD: CPT | Mod: CPTII,,, | Performed by: NURSE PRACTITIONER

## 2025-03-31 PROCEDURE — 1159F MED LIST DOCD IN RCRD: CPT | Mod: CPTII,,, | Performed by: NURSE PRACTITIONER

## 2025-03-31 PROCEDURE — 99214 OFFICE O/P EST MOD 30 MIN: CPT | Mod: ,,, | Performed by: NURSE PRACTITIONER

## 2025-03-31 RX ORDER — DULOXETIN HYDROCHLORIDE 30 MG/1
30 CAPSULE, DELAYED RELEASE ORAL DAILY
Qty: 30 CAPSULE | Refills: 11 | Status: SHIPPED | OUTPATIENT
Start: 2025-03-31 | End: 2026-03-31

## 2025-03-31 NOTE — ASSESSMENT & PLAN NOTE
Lab Results   Component Value Date    BUN 17 03/24/2025    CREATININE 1.22 03/24/2025    EGFRNORACEVR 69 03/24/2025    K 4.4 03/24/2025

## 2025-03-31 NOTE — PROGRESS NOTES
Patient ID: Isaac Rodriguez  : 1965     Chief Complaint: Hypertension (4-6 week follow up )    Allergies: Patient has no known allergies.     History of Present Illness:  The patient is a 59 y.o. White male who presents to clinic for evaluation and management with a chief complaint of Hypertension (4-6 week follow up )   Patient c/o burning in bilateral feet. Reports he has noticed it for years but worse over past year, now bothersome daily, especially when on his feet all day at work. Patient also c/o hand numbness and tingling of hands, right worse than left.      Depression Patient Health Questionnaire (PHQ-2)  Over the last two weeks how often have you been bothered by little interest or pleasure in doing things: Not at all  Over the last two weeks how often have you been bothered by feeling down, depressed or hopeless: Nearly every day  PHQ-2 Total Score: 3  Depression Patient Health Questionnaire (PHQ-9)  Over the last two weeks how often have you been bothered by little interest or pleasure in doing things: Not at all  Over the last two weeks how often have you been bothered by feeling down, depressed or hopeless: Nearly every day  Over the last two weeks how often have you been bothered by trouble falling or staying asleep, or sleeping too much: Nearly every day  Over the last two weeks how often have you been bothered by feeling tired or having little energy: Nearly every day  Over the last two weeks how often have you been bothered by a poor appetite or overeating: Not at all  Over the last two weeks how often have you been bothered by feeling bad about yourself - or that you are a failure or have let yourself or your family down: Not at all  Over the last two weeks how often have you been bothered by trouble concentrating on things, such as reading the newspaper or watching television: Nearly every day  Over the last two weeks how often have you been bothered by moving or speaking so slowly that  other people could have noticed. Or the opposite - being so fidgety or restless that you have been moving around a lot more than usual.: Not at all  Over the last two weeks how often have you been bothered by thoughts that you would be better off dead, or of hurting yourself: Not at all  If you checked off any problems, how difficult have these problems made it for you to do your work, take care of things at home or get along with other people?: Somewhat difficult  PHQ-9 Score: 12  PHQ-9 Interpretation: Moderate    Generalized Anxiety Disorder 7-item (YESSENIA-7) Scale. HOW OFTEN DURING THE PAST 2 WEEKS HAVE YOU FELT BOTHERED BY:  1. Feeling nervous, anxious, or on edge?: Nearly everyday  2. Not being able to stop or control worrying?: Nearly everyday  3. Worrying too much about different things?: Nearly everyday  4. Trouble relaxing?: More than half the days  5. Being so restless that it is hard to sit still?: Not at all  6. Becoming easily annoyed or irritable?: More than half the days  7. Feeling afraid as if something awful might happen?: More than half the days  8. If you checked off any problems, how difficult have these problems made it for you to do your work, take care of things at home, or get along with other people?: Extremely difficult  YESSENIA-7 Score: 15  Number answered (out of first 7): 7  Interpretation: Severe Anxiety          Hypertension  Associated symptoms include headaches. Pertinent negatives include no chest pain, palpitations or peripheral edema. Risk factors for coronary artery disease include family history, obesity, stress and male gender. Past treatments include beta blockers. The current treatment provides significant improvement.        Past Medical History:  has a past medical history of Depression, HTN (hypertension), Hyperlipidemia, and Kidney stones.    Social History:  reports that he has never smoked. He has never used smokeless tobacco. He reports that he does not currently use  "alcohol. He reports that he does not use drugs.    Care Team: Patient Care Team:  Enoc Lezama APRN as PCP - General (Family Medicine)  Efrain Navas OD as Consulting Physician (Optometry)  Shyam Junior DPM (Podiatry)     Current Medications:  Current Outpatient Medications   Medication Instructions    atorvastatin (LIPITOR) 40 mg, Oral, Nightly    DULoxetine (CYMBALTA) 60 mg, Oral, Daily    DULoxetine (CYMBALTA) 30 mg, Oral, Daily, Take with 60mg for total daily dose of 90mg    meclizine (ANTIVERT) 12.5 mg, Oral, Every 8 hours PRN    naproxen (NAPROSYN) 500 mg, Oral, 2 times daily with meals    pantoprazole (PROTONIX) 20 MG tablet TAKE ONE TABLET BY MOUTH EVERY MORNING EMPTY STOMACH    propranoloL (INDERAL LA) 60 mg, Oral, Daily    rizatriptan (MAXALT) 5 mg, Oral, Daily PRN, May repeat dose after 2 hr if necessary       Review of Systems   Constitutional:  Negative for fever.   HENT:  Negative for ear pain, rhinorrhea, sinus pressure/congestion, sore throat and tinnitus.    Eyes:  Negative for photophobia, pain and redness.   Respiratory:  Negative for cough.    Cardiovascular:  Negative for chest pain, palpitations and leg swelling.   Gastrointestinal:  Negative for abdominal pain and nausea.   Musculoskeletal:  Negative for back pain.   Neurological:  Positive for headaches. Negative for dizziness, seizures, weakness and numbness.        Visit Vitals  /88 (BP Location: Right arm, Patient Position: Sitting)   Pulse 95   Temp 97.7 °F (36.5 °C) (Temporal)   Ht 5' 7.01" (1.702 m)   Wt 109.1 kg (240 lb 9.6 oz)   SpO2 99%   BMI 37.67 kg/m²       Physical Exam  Vitals and nursing note reviewed.   Constitutional:       Appearance: Normal appearance. He is obese.   HENT:      Head: Normocephalic and atraumatic.      Nose: No congestion.      Mouth/Throat:      Mouth: Mucous membranes are moist.      Pharynx: Oropharynx is clear. No oropharyngeal exudate or posterior oropharyngeal erythema.   Eyes: "      Comments: Wearing eyeglasses   Cardiovascular:      Rate and Rhythm: Normal rate and regular rhythm.      Pulses: Normal pulses.           Dorsalis pedis pulses are 2+ on the right side.      Heart sounds: No murmur heard.  Pulmonary:      Effort: Pulmonary effort is normal.      Breath sounds: Normal breath sounds.   Musculoskeletal:         General: No swelling.      Right foot: Normal range of motion. No deformity.      Left foot: Normal range of motion. No deformity.   Feet:      Right foot:      Protective Sensation: 10 sites tested.  10 sites sensed.      Skin integrity: No ulcer, blister, skin breakdown or erythema.   Skin:     General: Skin is warm and dry.      Findings: No rash.   Neurological:      Mental Status: He is alert.      Gait: Gait normal.   Psychiatric:         Mood and Affect: Mood is anxious. Affect is tearful.         Behavior: Behavior is cooperative.         Thought Content: Thought content normal.         Judgment: Judgment normal.          Labs Reviewed:  Chemistry:  Lab Results   Component Value Date     03/24/2025    K 4.4 03/24/2025    BUN 17 03/24/2025    CREATININE 1.22 03/24/2025    EGFRNORACEVR 69 03/24/2025    GLUCOSE 123 (H) 07/15/2024    CALCIUM 9.3 03/24/2025    ALKPHOS 111 07/15/2024    LABPROT 6.7 07/15/2024    ALBUMIN 4.0 02/03/2025    AST 19 02/03/2025    ALT 26 02/03/2025    MG 1.70 (L) 02/15/2024    TSH 1.840 07/15/2024    PSA 0.84 08/05/2024        Lab Results   Component Value Date    HGBA1C 5.6 02/03/2025        Hematology:  Lab Results   Component Value Date    WBC 6.9 02/03/2025    RBC 4.58 02/03/2025    HGB 14.4 02/03/2025    HCT 45.1 02/03/2025    MCV 99 (H) 02/03/2025    MCH 31.4 02/03/2025    MCHC 31.9 02/03/2025    RDW 12.5 02/03/2025     02/03/2025    MPV 9.0 (L) 07/15/2024    MONO 12 02/03/2025    MONO 0.8 02/03/2025    BASO 0.1 02/03/2025    EOSINOPHIL 4 02/03/2025    BASOPHIL 1 02/03/2025       Lipid Panel:  Lab Results   Component Value  Date    CHOL 220 (H) 02/03/2025    HDL 37 (L) 02/03/2025    LDLCALC 129 (H) 02/03/2025    LDLDIRECT 114.4 (H) 07/15/2024    TRIG 301 (H) 02/03/2025        Assessment & Plan:  1. Burning sensation of feet  Comments:  order EMG and consult neurology, also advised to discuss symptoms with podiatry  Orders:  -     Ambulatory referral/consult to Neurology; Future; Expected date: 04/07/2025    2. Numbness and tingling in both hands  -     Ambulatory referral/consult to Neurology; Future; Expected date: 04/07/2025    3. CKD (chronic kidney disease) stage 2, GFR 60-89 ml/min  Overview:  advised to limit nsaid use. Improved to baseline stage 2    Assessment & Plan:  Lab Results   Component Value Date    BUN 17 03/24/2025    CREATININE 1.22 03/24/2025    EGFRNORACEVR 69 03/24/2025    K 4.4 03/24/2025           4. Essential hypertension  Overview:  Olmesartan caused orthostatic hypotension. tolerating propranolol for blood pressure, palpitations and headaches.      5. Primary osteoarthritis of both hips  Overview:  1/8/24 xr shows joint space narrowing,  bilateral acetabular and sacroiliac subchondral sclerosis, mild right acetabular osteophytosis . Declines PT referral       6. Moderate episode of recurrent major depressive disorder  Overview:  Discussed additional medication vs titrating up. Increase cymbalta to 90mg daily    Orders:  -     DULoxetine (CYMBALTA) 30 MG capsule; Take 1 capsule (30 mg total) by mouth once daily. Take with 60mg for total daily dose of 90mg  Dispense: 30 capsule; Refill: 11    7. Prediabetes  Overview:  Stopped metformin when restarted wegovy, cannot afford wegovy. Will monitor labs      8. Positive depression screening  Comments:  I have reviewed the positive depression score which warrants active treatment with psychotherapy and/or medications.         Future Appointments   Date Time Provider Department Center   5/5/2025  1:00 PM Enoc Lezama APRN [facility]   8/18/2025  9:00 AM  LAB, [facility] LABORATORY DRAW STATION [facility]   8/25/2025  2:30 PM Enoc Lezama APRN [facility]       Follow up for 4-6wk f/u, Anxiety/Depression. Call sooner if needed.    DEANNE RODRÍGUEZ        Lab Frequency Next Occurrence   Ambulatory referral/consult to ENT Once 08/12/2024   Ambulatory referral/consult to Neurology Once 08/29/2024      I have used clinical judgement based on duration and functional status to consider definite necessity for treatment.    Gastritis

## 2025-04-08 ENCOUNTER — TELEPHONE (OUTPATIENT)
Dept: FAMILY MEDICINE | Facility: CLINIC | Age: 60
End: 2025-04-08
Payer: COMMERCIAL

## 2025-04-08 NOTE — TELEPHONE ENCOUNTER
Says last Saturday feet started swelling and right leg hasn't really went down. Tried elevating and medication but hasn't resolved symptoms. Has burning and called into work because of the swelling. Says it is swollen in a different area today like in the shin area with pitting. Is wondering if he should come in tomorrow or is there anything else he can do

## 2025-04-09 ENCOUNTER — OFFICE VISIT (OUTPATIENT)
Dept: FAMILY MEDICINE | Facility: CLINIC | Age: 60
End: 2025-04-09
Payer: COMMERCIAL

## 2025-04-09 VITALS
SYSTOLIC BLOOD PRESSURE: 116 MMHG | OXYGEN SATURATION: 98 % | TEMPERATURE: 98 F | HEIGHT: 67 IN | BODY MASS INDEX: 37.83 KG/M2 | DIASTOLIC BLOOD PRESSURE: 80 MMHG | HEART RATE: 67 BPM | WEIGHT: 241 LBS

## 2025-04-09 DIAGNOSIS — M79.89 SWELLING OF LOWER EXTREMITY: Primary | ICD-10-CM

## 2025-04-09 DIAGNOSIS — R42 DIZZINESS: ICD-10-CM

## 2025-04-09 DIAGNOSIS — R00.2 PALPITATIONS: ICD-10-CM

## 2025-04-09 PROCEDURE — 3079F DIAST BP 80-89 MM HG: CPT | Mod: CPTII,,, | Performed by: NURSE PRACTITIONER

## 2025-04-09 PROCEDURE — 1160F RVW MEDS BY RX/DR IN RCRD: CPT | Mod: CPTII,,, | Performed by: NURSE PRACTITIONER

## 2025-04-09 PROCEDURE — 3074F SYST BP LT 130 MM HG: CPT | Mod: CPTII,,, | Performed by: NURSE PRACTITIONER

## 2025-04-09 PROCEDURE — 3008F BODY MASS INDEX DOCD: CPT | Mod: CPTII,,, | Performed by: NURSE PRACTITIONER

## 2025-04-09 PROCEDURE — 3044F HG A1C LEVEL LT 7.0%: CPT | Mod: CPTII,,, | Performed by: NURSE PRACTITIONER

## 2025-04-09 PROCEDURE — 99214 OFFICE O/P EST MOD 30 MIN: CPT | Mod: ,,, | Performed by: NURSE PRACTITIONER

## 2025-04-09 PROCEDURE — 1159F MED LIST DOCD IN RCRD: CPT | Mod: CPTII,,, | Performed by: NURSE PRACTITIONER

## 2025-04-09 PROCEDURE — 4010F ACE/ARB THERAPY RXD/TAKEN: CPT | Mod: CPTII,,, | Performed by: NURSE PRACTITIONER

## 2025-04-09 NOTE — PROGRESS NOTES
Patient ID: Isaac Rodriguez  : 1965    Chief Complaint: Foot Swelling    Allergies: Patient has no known allergies.     Subjective :  The patient is a 59 y.o. White male who presents to clinic for follow up on Foot Swelling   HPI   History of Present Illness    HPI:  Patient reports bilateral foot and ankle swelling, more severe on the right side, extending from the calf to the foot and toes, obliterating the ankle contour. The swelling worsens after work or in the afternoon and improves by morning with foot elevation. He soaks his feet in a bath after work at 11 PM for relief.    He describes burning sensation and pain in the feet, which recently became severe enough to cause him to leave work early on a Saturday night. The burning is localized to a specific area of the foot indicated during the visit. He also reports tingling, numbness, and throbbing sensations in the feet, as well as severe, intermittent foot pain.    Patient attempted to manage symptoms by staying in bed the previous day with feet elevated, resulting in some swelling reduction by afternoon. He has an upcoming appointment with a podiatrist, Dr. Junior, for evaluation of his foot issues and new symptoms of burning and pain. He expects to undergo tests during his next podiatry appointment.    Patient reports intermittent dizziness, which he manages by moving more slowly and adjusting his coping strategies. He has a history of palpitations, which have improved with Propranolol use. His hand tremors have significantly improved.    He denies current palpitations, chest pain, and leg cramping.    MEDICATIONS:  Patient is on Propranolol for palpitations, which has been effective in reducing both palpitations and hand tremors.    MEDICAL HISTORY:  Patient has a history of orthostatic hypotension, palpitations (which have improved with medication), and plantar fasciitis.      ROS:  ROS as indicated in HPI.           Social History:  reports that  "he has never smoked. He has never used smokeless tobacco. He reports that he does not currently use alcohol. He reports that he does not use drugs.    Past Medical History:  has a past medical history of Depression, HTN (hypertension), Hyperlipidemia, and Kidney stones.    Care Team: Patient Care Team:  nEoc Lezama APRN as PCP - General (Family Medicine)  Efrain Navas OD as Consulting Physician (Optometry)  Shyam Junior DPM (Podiatry)     Current Medications:  Current Outpatient Medications   Medication Instructions    atorvastatin (LIPITOR) 40 mg, Oral, Nightly    DULoxetine (CYMBALTA) 60 mg, Oral, Daily    DULoxetine (CYMBALTA) 30 mg, Oral, Daily, Take with 60mg for total daily dose of 90mg    meclizine (ANTIVERT) 12.5 mg, Oral, Every 8 hours PRN    naproxen (NAPROSYN) 500 mg, Oral, 2 times daily with meals    pantoprazole (PROTONIX) 20 MG tablet TAKE ONE TABLET BY MOUTH EVERY MORNING EMPTY STOMACH    propranoloL (INDERAL LA) 60 mg, Oral, Daily    rizatriptan (MAXALT) 5 mg, Oral, Daily PRN, May repeat dose after 2 hr if necessary         Visit Vitals  /80 (BP Location: Left arm)   Pulse 67   Temp 98.1 °F (36.7 °C) (Temporal)   Ht 5' 7.01" (1.702 m)   Wt 109.3 kg (241 lb)   SpO2 98%   BMI 37.73 kg/m²       Physical Exam  Vitals and nursing note reviewed.   Constitutional:       Appearance: Normal appearance. He is obese.   HENT:      Head: Normocephalic and atraumatic.      Nose: No congestion.      Mouth/Throat:      Mouth: Mucous membranes are moist.      Pharynx: Oropharynx is clear. No oropharyngeal exudate or posterior oropharyngeal erythema.   Eyes:      Comments: Wearing eyeglasses   Cardiovascular:      Rate and Rhythm: Normal rate and regular rhythm.      Pulses: Normal pulses.           Dorsalis pedis pulses are 2+ on the right side and 2+ on the left side.      Heart sounds: No murmur heard.  Pulmonary:      Effort: Pulmonary effort is normal.      Breath sounds: Normal breath " sounds.   Musculoskeletal:         General: No swelling.      Right lower le+ Pitting Edema present.      Left lower le+ Pitting Edema present.      Right foot: No deformity.      Left foot: No deformity.   Feet:      Right foot:      Skin integrity: No ulcer, blister, skin breakdown or erythema.      Left foot:      Skin integrity: No ulcer, blister, skin breakdown or erythema.   Skin:     General: Skin is warm and dry.      Findings: No rash.   Neurological:      Mental Status: He is alert.      Gait: Gait normal.   Psychiatric:         Behavior: Behavior is cooperative.         Thought Content: Thought content normal.         Judgment: Judgment normal.          Labs Reviewed:  Chemistry:  Lab Results   Component Value Date     2025    K 4.4 2025    BUN 17 2025    CREATININE 1.22 2025    EGFRNORACEVR 69 2025    GLUCOSE 123 (H) 07/15/2024    CALCIUM 9.3 2025    ALKPHOS 111 07/15/2024    LABPROT 6.7 07/15/2024    ALBUMIN 4.0 2025    AST 19 2025    ALT 26 2025    MG 1.70 (L) 02/15/2024    TSH 1.840 07/15/2024    PSA 0.84 2024        Lab Results   Component Value Date    HGBA1C 5.6 2025        Hematology:  Lab Results   Component Value Date    WBC 6.9 2025    RBC 4.58 2025    HGB 14.4 2025    HCT 45.1 2025    MCV 99 (H) 2025    MCH 31.4 2025    MCHC 31.9 2025    RDW 12.5 2025     2025    MPV 9.0 (L) 07/15/2024    MONO 12 2025    MONO 0.8 2025    BASO 0.1 2025    EOSINOPHIL 4 2025    BASOPHIL 1 2025       Lipid Panel:  Lab Results   Component Value Date    CHOL 220 (H) 2025    HDL 37 (L) 2025    LDLCALC 129 (H) 2025    LDLDIRECT 114.4 (H) 07/15/2024    TRIG 301 (H) 2025        Diagnosis:  1. Swelling of lower extremity  Overview:  Advised on compression stockings and low sodium diet. Discussed possibly adding fluid pill but  will hold off d/t ongoing dizzy episodes. Consult cardiovascular    Orders:  -     Ambulatory referral/consult to Cardiology; Future; Expected date: 04/16/2025    2. Palpitations  Overview:  Resolved with propranolol    Orders:  -     Ambulatory referral/consult to Cardiology; Future; Expected date: 04/16/2025    3. Dizziness  Overview:  Established care with ENT, attended therapy at [facility] in .     Orders:  -     Ambulatory referral/consult to Cardiology; Future; Expected date: 04/16/2025         Assessment & Plan    M79.89 Swelling of lower extremity  R00.2 Palpitations  R42 Dizziness    IMPRESSION:  - Assessed bilateral foot and ankle swelling, noting right leg more affected.  - Considered orthostatic hypotension and dizziness when evaluating treatment options.  - Decided against prescribing a fluid pill due to risk of exacerbating dizziness.  - Evaluated effectiveness of propranolol for palpitations and will continue at current dose.  - Considered need for vascular studies and US of lower extremities.  - Awaiting results from podiatrist (Dr. Junior) before determining further course of action.    M79.89 SWELLING OF LOWER EXTREMITY:  - Explained age-related loosening of blood vessels and its impact on circulation.  - Discussed the mechanism of compression stockings in improving circulation.  - Patient to wear compression stockings to manage lower extremity edema.  - Referred to cardiology/cardiovascular for evaluation of edema.    R00.2 PALPITATIONS:  - Referred to cardiology/cardiovascular for evaluation of history of palpitations.    R42 DIZZINESS:  - Referred to cardiology/cardiovascular for evaluation of dizziness.         Future Appointments   Date Time Provider Department Center   5/5/2025  1:00 PM Enoc Lezama APRN [facility]   8/18/2025  9:00 AM LAB, [facility]   8/25/2025  2:30 PM Enoc Lezama APRN [facility]       Follow up for  Print pt education, work excuse. Call sooner if needed.    DEANNE RODRÍGUEZ    Lab Frequency Next Occurrence   Ambulatory referral/consult to ENT Once 08/12/2024   Ambulatory referral/consult to Neurology Once 04/07/2025   Ambulatory referral/consult to Cardiology Once 04/16/2025            This note was generated with the assistance of ambient listening technology. Verbal consent was obtained by the patient and accompanying visitor(s) for the recording of patient appointment to facilitate this note. I attest to having reviewed and edited the generated note for accuracy, though some syntax or spelling errors may persist. Please contact the author of this note for any clarification.

## 2025-04-10 ENCOUNTER — PATIENT MESSAGE (OUTPATIENT)
Dept: FAMILY MEDICINE | Facility: CLINIC | Age: 60
End: 2025-04-10
Payer: COMMERCIAL

## 2025-04-13 DIAGNOSIS — R12 HEARTBURN: ICD-10-CM

## 2025-04-14 RX ORDER — PANTOPRAZOLE SODIUM 20 MG/1
TABLET, DELAYED RELEASE ORAL
Qty: 90 TABLET | Refills: 0 | Status: SHIPPED | OUTPATIENT
Start: 2025-04-14

## 2025-04-23 DIAGNOSIS — M54.42 ACUTE LEFT-SIDED LOW BACK PAIN WITH LEFT-SIDED SCIATICA: ICD-10-CM

## 2025-04-23 DIAGNOSIS — M25.552 LEFT HIP PAIN: ICD-10-CM

## 2025-04-23 RX ORDER — NAPROXEN 500 MG/1
500 TABLET ORAL 2 TIMES DAILY WITH MEALS
Qty: 60 TABLET | Refills: 0 | Status: SHIPPED | OUTPATIENT
Start: 2025-04-23

## 2025-05-05 ENCOUNTER — OFFICE VISIT (OUTPATIENT)
Dept: FAMILY MEDICINE | Facility: CLINIC | Age: 60
End: 2025-05-05
Payer: COMMERCIAL

## 2025-05-05 VITALS
HEIGHT: 67 IN | BODY MASS INDEX: 36.57 KG/M2 | OXYGEN SATURATION: 99 % | TEMPERATURE: 97 F | HEART RATE: 81 BPM | DIASTOLIC BLOOD PRESSURE: 84 MMHG | WEIGHT: 233 LBS | SYSTOLIC BLOOD PRESSURE: 132 MMHG

## 2025-05-05 DIAGNOSIS — M16.0 PRIMARY OSTEOARTHRITIS OF BOTH HIPS: ICD-10-CM

## 2025-05-05 DIAGNOSIS — F41.9 ANXIETY: ICD-10-CM

## 2025-05-05 DIAGNOSIS — Z13.31 POSITIVE DEPRESSION SCREENING: ICD-10-CM

## 2025-05-05 DIAGNOSIS — F32.9 TREATMENT-RESISTANT DEPRESSION: Primary | ICD-10-CM

## 2025-05-05 PROCEDURE — 3044F HG A1C LEVEL LT 7.0%: CPT | Mod: CPTII,,, | Performed by: NURSE PRACTITIONER

## 2025-05-05 PROCEDURE — 99214 OFFICE O/P EST MOD 30 MIN: CPT | Mod: ,,, | Performed by: NURSE PRACTITIONER

## 2025-05-05 PROCEDURE — 3079F DIAST BP 80-89 MM HG: CPT | Mod: CPTII,,, | Performed by: NURSE PRACTITIONER

## 2025-05-05 PROCEDURE — 1160F RVW MEDS BY RX/DR IN RCRD: CPT | Mod: CPTII,,, | Performed by: NURSE PRACTITIONER

## 2025-05-05 PROCEDURE — 3075F SYST BP GE 130 - 139MM HG: CPT | Mod: CPTII,,, | Performed by: NURSE PRACTITIONER

## 2025-05-05 PROCEDURE — 1159F MED LIST DOCD IN RCRD: CPT | Mod: CPTII,,, | Performed by: NURSE PRACTITIONER

## 2025-05-05 PROCEDURE — 4010F ACE/ARB THERAPY RXD/TAKEN: CPT | Mod: CPTII,,, | Performed by: NURSE PRACTITIONER

## 2025-05-05 PROCEDURE — 3008F BODY MASS INDEX DOCD: CPT | Mod: CPTII,,, | Performed by: NURSE PRACTITIONER

## 2025-05-05 RX ORDER — CARIPRAZINE 1.5 MG/1
1.5 CAPSULE, GELATIN COATED ORAL DAILY
Qty: 30 CAPSULE | Refills: 3 | Status: SHIPPED | OUTPATIENT
Start: 2025-05-05

## 2025-05-05 NOTE — PROGRESS NOTES
Patient ID: Isaac Rodriguez  : 1965     Chief Complaint: Anxiety and Depression    Allergies: Patient has no known allergies.     History of Present Illness:  The patient is a 59 y.o. White male who presents to clinic for evaluation and management with a chief complaint of Anxiety and Depression   3/31/25 patient scored a 12 on phq9 and 15 on gad7. Increased cymbalta to 90mg    Depression Patient Health Questionnaire (PHQ-2)  Over the last two weeks how often have you been bothered by little interest or pleasure in doing things: More than half the days  Over the last two weeks how often have you been bothered by feeling down, depressed or hopeless: Several days  PHQ-2 Total Score: 3  Depression Patient Health Questionnaire (PHQ-9)  Over the last two weeks how often have you been bothered by little interest or pleasure in doing things: More than half the days  Over the last two weeks how often have you been bothered by feeling down, depressed or hopeless: Several days  Over the last two weeks how often have you been bothered by trouble falling or staying asleep, or sleeping too much: More than half the days  Over the last two weeks how often have you been bothered by feeling tired or having little energy: Nearly every day  Over the last two weeks how often have you been bothered by a poor appetite or overeating: More than half the days  Over the last two weeks how often have you been bothered by feeling bad about yourself - or that you are a failure or have let yourself or your family down: Nearly every day  Over the last two weeks how often have you been bothered by trouble concentrating on things, such as reading the newspaper or watching television: More than half the days  Over the last two weeks how often have you been bothered by moving or speaking so slowly that other people could have noticed. Or the opposite - being so fidgety or restless that you have been moving around a lot more than usual.:  More than half the days  Over the last two weeks how often have you been bothered by thoughts that you would be better off dead, or of hurting yourself: Not at all  If you checked off any problems, how difficult have these problems made it for you to do your work, take care of things at home or get along with other people?: Very difficult  PHQ-9 Score: 17  PHQ-9 Interpretation: Moderately Severe    Generalized Anxiety Disorder 7-item (YESSENIA-7) Scale. HOW OFTEN DURING THE PAST 2 WEEKS HAVE YOU FELT BOTHERED BY:  1. Feeling nervous, anxious, or on edge?: Nearly everyday  2. Not being able to stop or control worrying?: Nearly everyday  3. Worrying too much about different things?: Nearly everyday  4. Trouble relaxing?: Nearly everyday  5. Being so restless that it is hard to sit still?: Not at all  6. Becoming easily annoyed or irritable?: Several days  7. Feeling afraid as if something awful might happen?: Not at all  8. If you checked off any problems, how difficult have these problems made it for you to do your work, take care of things at home, or get along with other people?: Very difficult  YESSENIA-7 Score: 13  Number answered (out of first 7): 7  Interpretation: Moderate Anxiety    Patient also c/o worsening hip pains, Both hips bothersome but left hip worse than right. Denies any recent falls or injuries. Patient had xrays in the past. Declined PT at the time. But patient reports worsening left hip pain and would like to see orthopedic if possible.     Hip Pain   The incident occurred more than 1 week ago. There was no injury mechanism. The pain is present in the left hip and left thigh. The quality of the pain is described as aching, burning and shooting. The pain is moderate. The pain has been Fluctuating since onset. He reports no foreign bodies present. The symptoms are aggravated by movement and weight bearing. He has tried acetaminophen and NSAIDs for the symptoms. The treatment provided mild relief.    Depression  Visit Type: follow-up  Patient presents with the following symptoms: depressed mood, fatigue, insomnia, irritability, muscle tension, nervousness/anxiety and thoughts of death.  Patient is not experiencing: anhedonia, chest pain, choking sensation, confusion, decreased concentration, dizziness, panic, psychomotor agitation, psychomotor retardation, restlessness, suicidal ideas, suicidal planning, weight gain and weight loss.           Past Medical History:  has a past medical history of Depression, HTN (hypertension), Hyperlipidemia, and Kidney stones.    Social History:  reports that he has never smoked. He has never used smokeless tobacco. He reports that he does not currently use alcohol. He reports that he does not use drugs.    Care Team: Patient Care Team:  Enoc Lezama APRN as PCP - General (Family Medicine)  Efrain Navas OD as Consulting Physician (Optometry)  Shyam Junior DPM (Podiatry)     Current Medications:  Current Outpatient Medications   Medication Instructions    atorvastatin (LIPITOR) 40 mg, Oral, Nightly    DULoxetine (CYMBALTA) 60 mg, Oral, Daily    DULoxetine (CYMBALTA) 30 mg, Oral, Daily, Take with 60mg for total daily dose of 90mg    meclizine (ANTIVERT) 12.5 mg, Oral, Every 8 hours PRN    naproxen (NAPROSYN) 500 mg, Oral, 2 times daily with meals    pantoprazole (PROTONIX) 20 MG tablet TAKE ONE TABLET BY MOUTH EVERY MORNING EMPTY STOMACH    propranoloL (INDERAL LA) 60 mg, Oral, Daily    rizatriptan (MAXALT) 5 mg, Oral, Daily PRN, May repeat dose after 2 hr if necessary    VRAYLAR 1.5 mg, Oral, Daily       Review of Systems   Constitutional:  Positive for irritability. Negative for weight gain and weight loss.   Respiratory:  Negative for choking.    Psychiatric/Behavioral:  Positive for depressed mood. Negative for confusion, decreased concentration and suicidal ideas. The patient is nervous/anxious and has insomnia.         Visit Vitals  /84 (BP  "Location: Right arm)   Pulse 81   Temp 97 °F (36.1 °C) (Temporal)   Ht 5' 7.01" (1.702 m)   Wt 105.7 kg (233 lb)   SpO2 99%   BMI 36.48 kg/m²       Physical Exam  Vitals and nursing note reviewed.   Constitutional:       Appearance: Normal appearance. He is obese.   HENT:      Head: Normocephalic and atraumatic.      Nose: Nose normal. No congestion.      Mouth/Throat:      Mouth: Mucous membranes are moist.      Pharynx: Oropharynx is clear. No oropharyngeal exudate or posterior oropharyngeal erythema.   Eyes:      Extraocular Movements: Extraocular movements intact.      Conjunctiva/sclera: Conjunctivae normal.      Comments: Wearing eyeglasses   Cardiovascular:      Rate and Rhythm: Normal rate and regular rhythm.      Pulses: Normal pulses.      Heart sounds: No murmur heard.  Pulmonary:      Effort: Pulmonary effort is normal.      Breath sounds: Normal breath sounds.   Musculoskeletal:         General: Tenderness present. No swelling.      Left hip: Tenderness (anterior left hip and groin pain) and bony tenderness present. No deformity, lacerations or crepitus. Decreased range of motion.      Right lower leg: No edema.      Left lower leg: No edema.   Lymphadenopathy:      Cervical: No cervical adenopathy.   Skin:     General: Skin is warm and dry.      Findings: No lesion or rash.   Neurological:      General: No focal deficit present.      Mental Status: He is alert and oriented to person, place, and time.      Gait: Gait normal.   Psychiatric:         Mood and Affect: Mood normal.         Judgment: Judgment normal.          Labs Reviewed:  Chemistry:  Lab Results   Component Value Date     03/24/2025    K 4.4 03/24/2025    BUN 17 03/24/2025    CREATININE 1.22 03/24/2025    EGFRNORACEVR 69 03/24/2025    CALCIUM 9.3 03/24/2025    ALKPHOS 111 07/15/2024    ALBUMIN 4.0 02/03/2025    AST 19 02/03/2025    ALT 26 02/03/2025    MG 1.70 (L) 02/15/2024    TSH 1.840 07/15/2024    PSA 0.84 08/05/2024        Lab " Results   Component Value Date    HGBA1C 5.6 02/03/2025        Hematology:  Lab Results   Component Value Date    WBC 6.9 02/03/2025    RBC 4.58 02/03/2025    HGB 14.4 02/03/2025    HCT 45.1 02/03/2025    MCV 99 (H) 02/03/2025    MCH 31.4 02/03/2025    MCHC 31.9 02/03/2025    RDW 12.5 02/03/2025     02/03/2025    MPV 9.0 (L) 07/15/2024    MONO 12 02/03/2025    MONO 0.8 02/03/2025    BASO 0.1 02/03/2025    EOSINOPHIL 4 02/03/2025    BASOPHIL 1 02/03/2025       Lipid Panel:  Lab Results   Component Value Date    CHOL 220 (H) 02/03/2025    HDL 37 (L) 02/03/2025    LDLCALC 129 (H) 02/03/2025    LDLDIRECT 114.4 (H) 07/15/2024    TRIG 301 (H) 02/03/2025        Assessment & Plan:  1. Treatment-resistant depression  Overview:  Discussed additional medication vs titrating up. Increase cymbalta to 90mg daily. Has initial apt with Josue Grewal on 5/12/25 for counseling. Continue cymbalta d/t helps with chronic musculoskeletal pain. Still not at goal, add vraylar, schedule next available apt with Merlin    Orders:  -     cariprazine (VRAYLAR) 1.5 mg Cap; Take 1 capsule (1.5 mg total) by mouth once daily.  Dispense: 30 capsule; Refill: 3    2. Anxiety  Overview:  Taking cymbalta      3. Primary osteoarthritis of both hips  Overview:  1/8/24 xr shows joint space narrowing,  bilateral acetabular and sacroiliac subchondral sclerosis, mild right acetabular osteophytosis . Declined PT referral  at the time. Refer to orthopedic per patient request.     Orders:  -     Ambulatory referral/consult to Orthopedics; Future; Expected date: 05/12/2025    4. Positive depression screening  Comments:  I have reviewed the positive depression score which warrants active treatment with psychotherapy and/or medications.         Future Appointments   Date Time Provider Department Center   6/9/2025  2:00 PM Abraham Qureshi, [facility]   8/18/2025  9:00 AM LAB, [facility]    8/25/2025  2:30 PM Enoc Lezama APRN [facility]       Follow up for next new pt apt w/ Merlin. Call sooner if needed.    DEANNE RODRÍGUEZ        Lab Frequency Next Occurrence   Ambulatory referral/consult to ENT Once 08/12/2024   Ambulatory referral/consult to Neurology Once 04/07/2025   Ambulatory referral/consult to Cardiology Once 04/16/2025    I have reviewed the positive depression score which warrants active treatment with psychotherapy and/or medications.

## 2025-05-06 ENCOUNTER — PATIENT MESSAGE (OUTPATIENT)
Dept: FAMILY MEDICINE | Facility: CLINIC | Age: 60
End: 2025-05-06
Payer: COMMERCIAL

## 2025-05-08 ENCOUNTER — OFFICE VISIT (OUTPATIENT)
Dept: FAMILY MEDICINE | Facility: CLINIC | Age: 60
End: 2025-05-08
Payer: COMMERCIAL

## 2025-05-08 VITALS
BODY MASS INDEX: 36.88 KG/M2 | WEIGHT: 235 LBS | OXYGEN SATURATION: 97 % | TEMPERATURE: 97 F | HEIGHT: 67 IN | DIASTOLIC BLOOD PRESSURE: 82 MMHG | SYSTOLIC BLOOD PRESSURE: 138 MMHG | HEART RATE: 92 BPM

## 2025-05-08 DIAGNOSIS — R50.9 FEVER, UNSPECIFIED FEVER CAUSE: ICD-10-CM

## 2025-05-08 DIAGNOSIS — R09.81 SINUS CONGESTION: ICD-10-CM

## 2025-05-08 DIAGNOSIS — R11.0 NAUSEA: ICD-10-CM

## 2025-05-08 DIAGNOSIS — J06.9 ACUTE UPPER RESPIRATORY INFECTION: Primary | ICD-10-CM

## 2025-05-08 LAB
CTP QC/QA: YES
CTP QC/QA: YES
FLUAV AG NPH QL: NEGATIVE
FLUBV AG NPH QL: NEGATIVE
SARS CORONAVIRUS 2 ANTIGEN: NEGATIVE

## 2025-05-08 NOTE — PROGRESS NOTES
Patient ID: Isaac Rodriguez  : 1965    Chief Complaint: Nausea    Allergies: Patient has no known allergies.     Subjective :  The patient is a 59 y.o. White male who presents to clinic for follow up on Nausea   Nausea  Associated symptoms include nausea.      History of Present Illness    HPI:  Patient reports illness onset yesterday evening with a low-grade fever. He vomited once this morning, attributing it to postnasal drip. Patient reports feeling significantly improved now, but still describes general malaise. He left work early last night due to severe symptoms.    Patient's symptoms include rhinorrhea and recent onset of cough. A headache has developed, described as tension-like at the base of the neck and between the shoulder blades. He notes nasal congestion, with unilateral obstruction while sleeping last night, leading to mouth breathing and discomfort. Patient also mentions a lesion near his nose, which he believes might be a fever blister indicative of an immune response.    Regarding potential etiology, patient mentions recent exposure to rain at work, resulting in thoroughly soaked clothing.    Patient's sleep has been affected, with altered sleep schedule due to work. He slept deeply, having unusual dreams, before waking at 4:30-5:00 AM to vomit. Afterward, he had difficulty falling back asleep, checking his phone frequently.    For symptom management, patient has been using Dayquil and Nyquil. He consumed a biscuit and a carbonated beverage last night to take his medicine, avoiding dairy due to his symptoms.      MEDICATIONS:  Patient is on Flonase, Advil Cold and Sinus. He is also taking some nausea medication that was previously prescribed by his doctor, with some remaining.    TEST RESULTS:  Patient underwent both flu and COVID tests today. Both tests returned negative results.      ROS:  ROS as indicated in HPI.           Social History:  reports that he has never smoked. He has never  "used smokeless tobacco. He reports that he does not currently use alcohol. He reports that he does not use drugs.    Past Medical History:  has a past medical history of Depression, HTN (hypertension), Hyperlipidemia, and Kidney stones.    Care Team: Patient Care Team:  Enoc Lezama APRN as PCP - General (Family Medicine)  Efrain Navas OD as Consulting Physician (Optometry)  Shyam Junior DPM (Podiatry)     Current Medications:  Current Outpatient Medications   Medication Instructions    atorvastatin (LIPITOR) 40 mg, Oral, Nightly    DULoxetine (CYMBALTA) 60 mg, Oral, Daily    DULoxetine (CYMBALTA) 30 mg, Oral, Daily, Take with 60mg for total daily dose of 90mg    meclizine (ANTIVERT) 12.5 mg, Oral, Every 8 hours PRN    naproxen (NAPROSYN) 500 mg, Oral, 2 times daily with meals    pantoprazole (PROTONIX) 20 MG tablet TAKE ONE TABLET BY MOUTH EVERY MORNING EMPTY STOMACH    propranoloL (INDERAL LA) 60 mg, Oral, Daily    rizatriptan (MAXALT) 5 mg, Oral, Daily PRN, May repeat dose after 2 hr if necessary    VRAYLAR 1.5 mg, Oral, Daily         Visit Vitals  /82 (BP Location: Right arm)   Pulse 92   Temp 97.2 °F (36.2 °C) (Temporal)   Ht 5' 7.01" (1.702 m)   Wt 106.6 kg (235 lb)   SpO2 97%   BMI 36.80 kg/m²       Physical Exam  Vitals and nursing note reviewed.   Constitutional:       Appearance: Normal appearance. He is obese.   HENT:      Head: Normocephalic and atraumatic.      Right Ear: Tympanic membrane, ear canal and external ear normal.      Left Ear: Tympanic membrane, ear canal and external ear normal.      Nose: Rhinorrhea present. No congestion. Rhinorrhea is clear.      Mouth/Throat:      Mouth: Mucous membranes are moist.      Pharynx: Oropharynx is clear. Posterior oropharyngeal erythema present. No oropharyngeal exudate.   Eyes:      Comments: Wearing eyeglasses   Cardiovascular:      Rate and Rhythm: Normal rate and regular rhythm.      Pulses: Normal pulses.      Heart sounds: No " murmur heard.  Pulmonary:      Effort: Pulmonary effort is normal.      Breath sounds: Rhonchi present. No wheezing.   Musculoskeletal:         General: No swelling.   Skin:     General: Skin is warm and dry.      Findings: No rash.   Neurological:      Mental Status: He is alert.      Gait: Gait normal.   Psychiatric:         Behavior: Behavior is cooperative.         Thought Content: Thought content normal.         Judgment: Judgment normal.          Labs Reviewed:  Chemistry:  Lab Results   Component Value Date     03/24/2025    K 4.4 03/24/2025    BUN 17 03/24/2025    CREATININE 1.22 03/24/2025    EGFRNORACEVR 69 03/24/2025    CALCIUM 9.3 03/24/2025    ALKPHOS 111 07/15/2024    ALBUMIN 4.0 02/03/2025    AST 19 02/03/2025    ALT 26 02/03/2025    MG 1.70 (L) 02/15/2024    TSH 1.840 07/15/2024    PSA 0.84 08/05/2024        Lab Results   Component Value Date    HGBA1C 5.6 02/03/2025        Hematology:  Lab Results   Component Value Date    WBC 6.9 02/03/2025    RBC 4.58 02/03/2025    HGB 14.4 02/03/2025    HCT 45.1 02/03/2025    MCV 99 (H) 02/03/2025    MCH 31.4 02/03/2025    MCHC 31.9 02/03/2025    RDW 12.5 02/03/2025     02/03/2025    MPV 9.0 (L) 07/15/2024    MONO 12 02/03/2025    MONO 0.8 02/03/2025    BASO 0.1 02/03/2025    EOSINOPHIL 4 02/03/2025    BASOPHIL 1 02/03/2025       Lipid Panel:  Lab Results   Component Value Date    CHOL 220 (H) 02/03/2025    HDL 37 (L) 02/03/2025    LDLCALC 129 (H) 02/03/2025    LDLDIRECT 114.4 (H) 07/15/2024    TRIG 301 (H) 02/03/2025        Diagnosis:  1. Acute upper respiratory infection    2. Nausea  Comments:  has nausea medication at home  Orders:  -     SARS Coronavirus 2 Antigen, POCT Manual Read  -     POCT Influenza A/B Rapid Antigen    3. Fever, unspecified fever cause  -     SARS Coronavirus 2 Antigen, POCT Manual Read  -     POCT Influenza A/B Rapid Antigen    4. Sinus congestion  -     SARS Coronavirus 2 Antigen, POCT Manual Read  -     POCT Influenza  A/B Rapid Antigen         Assessment & Plan    J06.9 Acute upper respiratory infection  R11.0 Nausea  R50.9 Fever, unspecified fever cause  R09.81 Sinus congestion    IMPRESSION:  - Presenting with viral symptoms including vomiting, fever, nasal congestion, and cough.  - Negative for flu and COVID-19 based on recent testing.  - Advised symptomatic treatment and rest due to likely viral infection.    J06.9 ACUTE UPPER RESPIRATORY INFECTION:  - Explained that viral infections, including common cold, typically need to run their course but can cause significant discomfort.  - Continue Dayquil and Nyquil for symptom relief.    R11.0 NAUSEA:  - Continue previously prescribed nausea medication.  - Patient advised to avoid dairy products while stomach is upset.    R09.81 SINUS CONGESTION:  - Reviewed proper use of OTC nasal spray (Afrin) for congestion relief, emphasizing not to use for more than 3 days consecutively.         Future Appointments   Date Time Provider Department Center   6/2/2025 10:00 AM Jl Zarate MD [facility]   6/9/2025  2:00 PM Abraham Qureshi PMHNP [facility]   8/18/2025  9:00 AM LAB, [facility] LABORATORY DRAW STATION [facility] NICOL[facility]   8/25/2025  2:30 PM Enoc Lezama APRN [facility]       Follow up for work excuse return to work monday. Call sooner if needed.    DEANNE RODRÍGUEZ    Lab Frequency Next Occurrence   Ambulatory referral/consult to ENT Once 08/12/2024   Ambulatory referral/consult to Neurology Once 04/07/2025   Ambulatory referral/consult to Cardiology Once 04/16/2025   Ambulatory referral/consult to Orthopedics Once 05/12/2025            This note was generated with the assistance of ambient listening technology. Verbal consent was obtained by the patient and accompanying visitor(s) for the recording of patient appointment to facilitate this note. I attest to having reviewed and edited the generated note for accuracy, though some  syntax or spelling errors may persist. Please contact the author of this note for any clarification.

## 2025-05-14 ENCOUNTER — TELEPHONE (OUTPATIENT)
Dept: FAMILY MEDICINE | Facility: CLINIC | Age: 60
End: 2025-05-14
Payer: COMMERCIAL

## 2025-05-14 DIAGNOSIS — G44.209 ACUTE NON INTRACTABLE TENSION-TYPE HEADACHE: ICD-10-CM

## 2025-05-14 DIAGNOSIS — J01.00 ACUTE NON-RECURRENT MAXILLARY SINUSITIS: Primary | ICD-10-CM

## 2025-05-14 RX ORDER — CEFDINIR 300 MG/1
300 CAPSULE ORAL 2 TIMES DAILY
Qty: 20 CAPSULE | Refills: 0 | Status: SHIPPED | OUTPATIENT
Start: 2025-05-14 | End: 2025-05-24

## 2025-05-14 RX ORDER — RIZATRIPTAN BENZOATE 5 MG/1
5 TABLET ORAL DAILY PRN
Qty: 12 TABLET | Refills: 1 | Status: SHIPPED | OUTPATIENT
Start: 2025-05-14 | End: 2025-06-13

## 2025-05-14 NOTE — TELEPHONE ENCOUNTER
Pt states that he has not gotten better from appt on 5/8/25. Yesterday developed severe HA with color spectrum auras, nausea and dizziness. He has lots of pressure and pain in nasal passage and into teeth. He is still using the OTC meds to Tx symptoms, also took rizatriptan for HA and got very little relief, does he need to come in or can refill for rizatripan and abx be sent in? He uses [facility]

## 2025-05-14 NOTE — TELEPHONE ENCOUNTER
I have written for the following medications and/or orders for the patient.  Please notify the patient.  Treat now for bacterial sinus infection and refilled rizatriptan.       Medications Ordered This Encounter   Medications    cefdinir (OMNICEF) 300 MG capsule     Sig: Take 1 capsule (300 mg total) by mouth 2 (two) times daily. for 10 days     Dispense:  20 capsule     Refill:  0    rizatriptan (MAXALT) 5 MG tablet     Sig: Take 1 tablet (5 mg total) by mouth daily as needed for Migraine. May repeat dose after 2 hr if necessary     Dispense:  12 tablet     Refill:  1

## 2025-05-19 ENCOUNTER — TELEPHONE (OUTPATIENT)
Dept: FAMILY MEDICINE | Facility: CLINIC | Age: 60
End: 2025-05-19
Payer: COMMERCIAL

## 2025-05-20 ENCOUNTER — HOSPITAL ENCOUNTER (EMERGENCY)
Facility: HOSPITAL | Age: 60
Discharge: HOME | End: 2025-05-20
Payer: COMMERCIAL

## 2025-05-20 VITALS
OXYGEN SATURATION: 97 % | HEIGHT: 67 IN | RESPIRATION RATE: 12 BRPM | SYSTOLIC BLOOD PRESSURE: 156 MMHG | WEIGHT: 234 LBS | TEMPERATURE: 98 F | BODY MASS INDEX: 36.73 KG/M2 | HEART RATE: 91 BPM | DIASTOLIC BLOOD PRESSURE: 94 MMHG

## 2025-05-20 DIAGNOSIS — R07.9 CHEST PAIN: ICD-10-CM

## 2025-05-20 DIAGNOSIS — R00.0 TACHYCARDIA: Primary | ICD-10-CM

## 2025-05-20 DIAGNOSIS — R00.0 SINUS TACHYCARDIA: ICD-10-CM

## 2025-05-20 LAB
ALBUMIN SERPL-MCNC: 4.5 G/DL (ref 3.4–5)
ALBUMIN/GLOB SERPL: 1.4 RATIO
ALP SERPL-CCNC: 117 UNIT/L (ref 50–144)
ALT SERPL-CCNC: 76 UNIT/L (ref 1–45)
ANION GAP SERPL CALC-SCNC: 2 MEQ/L (ref 2–13)
AST SERPL-CCNC: 43 UNIT/L (ref 17–59)
BASOPHILS # BLD AUTO: 0.05 X10(3)/MCL (ref 0.01–0.08)
BASOPHILS NFR BLD AUTO: 0.7 % (ref 0.1–1.2)
BILIRUB SERPL-MCNC: 1.2 MG/DL (ref 0–1)
BNP BLD-MCNC: 115 PG/ML (ref 0–124.9)
BUN SERPL-MCNC: 22 MG/DL (ref 7–20)
CALCIUM SERPL-MCNC: 9.6 MG/DL (ref 8.4–10.2)
CHLORIDE SERPL-SCNC: 107 MMOL/L (ref 98–110)
CO2 SERPL-SCNC: 28 MMOL/L (ref 21–32)
CREAT SERPL-MCNC: 1.29 MG/DL (ref 0.66–1.25)
CREAT/UREA NIT SERPL: 17 (ref 12–20)
EOSINOPHIL # BLD AUTO: 0.15 X10(3)/MCL (ref 0.04–0.54)
EOSINOPHIL NFR BLD AUTO: 2 % (ref 0.7–7)
ERYTHROCYTE [DISTWIDTH] IN BLOOD BY AUTOMATED COUNT: 13.6 %
GFR SERPLBLD CREATININE-BSD FMLA CKD-EPI: 64 ML/MIN/1.73/M2
GLOBULIN SER-MCNC: 3.2 GM/DL (ref 2–3.9)
GLUCOSE SERPL-MCNC: 103 MG/DL (ref 70–115)
HCT VFR BLD AUTO: 48 % (ref 36–52)
HGB BLD-MCNC: 16.2 G/DL (ref 13–18)
IMM GRANULOCYTES # BLD AUTO: 0.02 X10(3)/MCL (ref 0–0.03)
IMM GRANULOCYTES NFR BLD AUTO: 0.3 % (ref 0–0.5)
LYMPHOCYTES # BLD AUTO: 2.35 X10(3)/MCL (ref 1.32–3.57)
LYMPHOCYTES NFR BLD AUTO: 31.7 % (ref 20–55)
MAGNESIUM SERPL-MCNC: 1.8 MG/DL (ref 1.8–2.4)
MCH RBC QN AUTO: 31.8 PG (ref 27–34)
MCHC RBC AUTO-ENTMCNC: 33.8 G/DL (ref 31–37)
MCV RBC AUTO: 94.1 FL (ref 79–99)
MONOCYTES # BLD AUTO: 0.53 X10(3)/MCL (ref 0.3–0.82)
MONOCYTES NFR BLD AUTO: 7.1 % (ref 4.7–12.5)
NEUTROPHILS # BLD AUTO: 4.32 X10(3)/MCL (ref 1.78–5.38)
NEUTROPHILS NFR BLD AUTO: 58.2 % (ref 37–73)
NRBC BLD AUTO-RTO: 0 %
PLATELET # BLD AUTO: 311 X10(3)/MCL (ref 140–371)
PMV BLD AUTO: 8.8 FL (ref 9.4–12.4)
POTASSIUM SERPL-SCNC: 3.6 MMOL/L (ref 3.5–5.1)
PROT SERPL-MCNC: 7.7 GM/DL (ref 6.3–8.2)
RBC # BLD AUTO: 5.1 X10(6)/MCL (ref 4–6)
SODIUM SERPL-SCNC: 137 MMOL/L (ref 136–145)
T4 FREE SERPL-MCNC: 1.24 NG/DL (ref 0.78–2.19)
TROPONIN I SERPL-MCNC: <0.012 NG/ML (ref 0–0.03)
TSH SERPL-ACNC: 1.83 UIU/ML (ref 0.36–3.74)
WBC # BLD AUTO: 7.42 X10(3)/MCL (ref 4–11.5)

## 2025-05-20 PROCEDURE — 85025 COMPLETE CBC W/AUTO DIFF WBC: CPT | Performed by: EMERGENCY MEDICINE

## 2025-05-20 PROCEDURE — 99285 EMERGENCY DEPT VISIT HI MDM: CPT | Mod: 25

## 2025-05-20 PROCEDURE — 84439 ASSAY OF FREE THYROXINE: CPT | Performed by: EMERGENCY MEDICINE

## 2025-05-20 PROCEDURE — 25000003 PHARM REV CODE 250: Performed by: EMERGENCY MEDICINE

## 2025-05-20 PROCEDURE — 83880 ASSAY OF NATRIURETIC PEPTIDE: CPT | Performed by: EMERGENCY MEDICINE

## 2025-05-20 PROCEDURE — 93010 ELECTROCARDIOGRAM REPORT: CPT | Mod: ,,, | Performed by: INTERNAL MEDICINE

## 2025-05-20 PROCEDURE — 84484 ASSAY OF TROPONIN QUANT: CPT | Performed by: EMERGENCY MEDICINE

## 2025-05-20 PROCEDURE — 80053 COMPREHEN METABOLIC PANEL: CPT | Performed by: EMERGENCY MEDICINE

## 2025-05-20 PROCEDURE — 84443 ASSAY THYROID STIM HORMONE: CPT | Performed by: EMERGENCY MEDICINE

## 2025-05-20 PROCEDURE — 25500020 PHARM REV CODE 255: Performed by: EMERGENCY MEDICINE

## 2025-05-20 PROCEDURE — 96360 HYDRATION IV INFUSION INIT: CPT

## 2025-05-20 PROCEDURE — 83735 ASSAY OF MAGNESIUM: CPT | Performed by: EMERGENCY MEDICINE

## 2025-05-20 PROCEDURE — 93005 ELECTROCARDIOGRAM TRACING: CPT

## 2025-05-20 RX ADMIN — SODIUM CHLORIDE 1000 ML: 9 INJECTION, SOLUTION INTRAVENOUS at 01:05

## 2025-05-20 RX ADMIN — IOHEXOL 84 ML: 350 INJECTION, SOLUTION INTRAVENOUS at 02:05

## 2025-05-20 NOTE — ED PROVIDER NOTES
CHIEF COMPLAINT    Chief Complaint   Patient presents with    Tachycardia     Pt sent from [facility] office, heart rate at home in 150s with elevated BP. Pt also feels jittery with frontal headache. NAD.           HISTORY OF PRESENT ILLNESS    This is a pleasant 59-year-old  man past medical history significant for obesity, hypertension, hyperlipidemia, anxiety, migraines, GERD who presents to the emergency department with chief complaint of tachycardia/palpitations.  Patient reports he has been in his normal state of health in a few weeks ago developed a viral URI.  His symptoms improved somewhat but then seemed to get slightly worse in his primary care physician put him on cefdinir she is currently taking.  He reports that those infectious symptoms have improved.  Last week he had some lower extremity pitting edema but that seems to have resolved.  Primary care referred him to Cardiology and he saw them yesterday and he was told that his blood pressure in his heart rate were both elevated.  It sounds like they have scheduled him for some outpatient testing and to have a follow up visit with Cardiology but they did not make any medication changes.  The patient reports that he has felt sensation of tachycardia and palpitations for the last few days and his heart rate monitor at home as been widely variable from 100 beats per minute up to 150+ beats per minute.  There is mild fatigue but no real chest pain or shortness of breath.  Reports that the lower extremity edema is entirely gone.  No recent travel or immobilization or calf pain.  No history of arrhythmias, AFib, CHF, coronary artery disease.  He denies any recreational drug use.  He only drinks about 1 caffeinated beverage a day with no recent changes in that.  Denies any fevers.  Denies any known history of thyroid disease.               REVIEW OF SYSTEMS    All 10 systems reviewed and negative except as marked.    GEN: No fever/chills  HEENT: No sore  "throat or ear aches.  CV:  See HPI  RESP: No cough or shortness of breath  GI: No nausea, vomiting or diarrhea. No abdominal pain.  : No dysuria or urinary frequency.  NEURO: No focal weakness, numbness/tingling or dizziness, speech or visual changes.  SKIN: No redness, swelling, burises or rash.  MSK: No joint pain or swelling, no injrueis or deformities.  ENDO: No polyuria, polydypsia.  PSYCH: No depression or anxiety.       PHYSICAL EXAM    Physical Exam  Vitals and nursing note reviewed.      BP (!) 156/94   Pulse 91   Temp 98 °F (36.7 °C) (Oral)   Resp 12   Ht 5' 7" (1.702 m)   Wt 234 lb (106.1 kg)   SpO2 97%   BMI 36.65 kg/m²     VITALS: Reviewed Triage Vitals and Updated Vitals myself  GEN:  Overweight middle-aged  man laying in the ER stretcher, in no apparent distress, very jovial and joking around with staff and myself, pleasant and cooperative with exam and in no distress whatsoever.  HEENT: EOMI, mmm, longer beard.  CV:  Regular approximately 130 beats per minute, no murmurs, rubs, gallops. No LE pitting edema. Chest wall nontender.  LUNG: CTAB, no wheezes, rhales, or rhonchi. Nonlabored, no accessory muscle use. Speaking in full sentences.  ABD: Soft, nondistented, normal bowel sounds. No TTP, no rebound, no guarding, no pulsatile masses.   NEURO: Alert, no obvious facial asymmetry, moving all extremities  MSK: FROM o fall extremities, no obivous deformities  SKIN: No obvious rashes/bruises.  PSYCH: Normal mood/affect.            PAST MEDICAL HISTORY    Past Medical History:   Diagnosis Date    Depression     HTN (hypertension)     Hyperlipidemia     Kidney stones            FAMILY HISTORY    Family History   Problem Relation Name Age of Onset    Lung cancer Mother      Cancer Father             SOCIAL HISTORY    Social History     Socioeconomic History    Marital status: Significant Other   Tobacco Use    Smoking status: Never    Smokeless tobacco: Never   Substance and Sexual " Activity    Alcohol use: Not Currently    Drug use: Never    Sexual activity: Yes     Partners: Female     Social Drivers of Health     Financial Resource Strain: Low Risk  (7/14/2023)    Overall Financial Resource Strain (CARDIA)     Difficulty of Paying Living Expenses: Not hard at all   Food Insecurity: No Food Insecurity (7/14/2023)    Hunger Vital Sign     Worried About Running Out of Food in the Last Year: Never true     Ran Out of Food in the Last Year: Never true   Transportation Needs: No Transportation Needs (7/14/2023)    PRAPARE - Transportation     Lack of Transportation (Medical): No     Lack of Transportation (Non-Medical): No   Physical Activity: Sufficiently Active (7/14/2023)    Exercise Vital Sign     Days of Exercise per Week: 5 days     Minutes of Exercise per Session: 30 min   Stress: No Stress Concern Present (7/14/2023)    [employer] - Occupational Stress Questionnaire     Feeling of Stress : Not at all   Housing Stability: Low Risk  (7/14/2023)    Housing Stability Vital Sign     Unable to Pay for Housing in the Last Year: No     Number of Places Lived in the Last Year: 1     Unstable Housing in the Last Year: No           SURGICAL HISTORY    Past Surgical History:   Procedure Laterality Date    CHOLECYSTECTOMY      KNEE SURGERY Right     SHOULDER SURGERY Left          ALLERGIES    Review of patient's allergies indicates:  No Known Allergies      CURRENT MEDICATIONS    Current Medications[1]                      RADIOLOGY / LABS / MISCELLANEOUS STUDIES:    Imaging Results              CTA Chest Non-Coronary (PE Studies) (Final result)  Result time 05/20/25 14:57:13      Final result by Jaimie Vitale III, MD (05/20/25 14:57:13)                   Impression:      1. There is no evidence of pulmonary embolus or other significant abnormalities involving the pulmonary arterial vasculature to the level of the segmental arterial branches.      Electronically signed  by: Jaimie Vitale  Date:    05/20/2025  Time:    14:57               Narrative:    EXAMINATION:  STUDY:CTA CHEST NON CORONARY (PE STUDIES)    CLINICAL HISTORY AND TECHNIQUE:  Suspected pulmonary embolus    This patient has had 0 CT and nuclear medicine scans performed within the last 12 months.    The following DOSE REDUCTION TECHNIQUES are used for all CT scans at [facility]:    1. Automated exposure control.  2. Adjustment of the mA and/or kv according to patient size.  3. Use of iterative reconstruction technique.    COMPARISON:  None    FINDINGS:  CT angiography of the pulmonary arterial vasculature with intravenous contrast was performed with post processing and multiplanar and 3 dimensional reconstruction of images. The quality of the study is good. The pulmonary arterial vasculature appears unremarkable without evidence of significant thrombus/pulmonary embolus, stenotic segments, aneurysmal dilatation, vascular malformations or other significant abnormalities to the level of the segmental arterial branches.                                            LABS    Labs Reviewed   COMPREHENSIVE METABOLIC PANEL - Abnormal       Result Value    Sodium 137      Potassium 3.6      Chloride 107      CO2 28      Glucose 103      Blood Urea Nitrogen 22 (*)     Creatinine 1.29 (*)     Calcium 9.6      Protein Total 7.7      Albumin 4.5      Globulin 3.2      Albumin/Globulin Ratio 1.4      Bilirubin Total 1.2 (*)           ALT 76 (*)     AST 43      eGFR 64      Anion Gap 2.0      BUN/Creatinine Ratio 17     CBC WITH DIFFERENTIAL - Abnormal    WBC 7.42      RBC 5.10      Hgb 16.2      Hct 48.0      MCV 94.1      MCH 31.8      MCHC 33.8      RDW 13.6      Platelet 311      MPV 8.8 (*)     Neut % 58.2      Lymph % 31.7      Mono % 7.1      Eos % 2.0      Basophil % 0.7      Lymph # 2.35      Neut # 4.32      Mono # 0.53      Eos # 0.15      Baso # 0.05      Imm Gran # 0.02      Imm Grans % 0.3       NRBC% 0.0     TROPONIN I - Normal    Troponin-I <0.012     NT-PRO NATRIURETIC PEPTIDE - Normal    ProBNP 115.0     TSH - Normal    TSH 1.830     T4, FREE - Normal    Thyroxine Free 1.24     MAGNESIUM - Normal    Magnesium Level 1.80     CBC W/ AUTO DIFFERENTIAL    Narrative:     The following orders were created for panel order CBC auto differential.                  Procedure                               Abnormality         Status                                     ---------                               -----------         ------                                     CBC with Differential[[id number]]       Abnormal            Final result                                                 Please view results for these tests on the individual orders.            FUTURE APPOINTMENTS    Future Appointments   Date Time Provider Department Center   6/2/2025 10:00 AM Jl Zarate MD [facility]   6/9/2025  2:00 PM Abraham Qureshi PMHNP [facility]   8/18/2025  9:00 AM LAB, [facility] LABORATORY DRAW STATION [facility]   8/25/2025  2:30 PM Enoc Lezama APRN [facility]                      Medical Decision Making  Amount and/or Complexity of Data Reviewed  Labs: ordered.  Radiology: ordered.    Risk  Prescription drug management.        -I reviewed available prior medical records in the EPIC Electronic Medical Record and any paper documents brought by patient, family, EMS staff and any others available to me.     -Pertinent details are summarized in this document    -All laboratory, radiologic, and EKG studies that were performed in the Emergency Department were a necessary part of the evaluation needed to exclude unstable or  emergent medical conditions. Each of the unique tests during this ER encounter were ordered by myself for workup of this patient. I also reviewed all of the results.     -Patient's prescriptions were reviewed and changes  documented.              MY MEDICAL DECISION MAKING and   ASSESSMENT & PLAN FOR ER VISIT:       I am unsure of the exact cause at this time but I am thinking this is likely a primary cardiac arrhythmias some type.  He could have atrial fibrillation, atrial tachycardia, SVT, etc..  Pulmonary embolus is on the differential as well.  Graft thyroid derangements.  Severe anemia could cause this as well but seems less likely with sudden onset.  ACS, NSTEMI, STEMI on differential but seems unlikely.  He does not have any SIRS criteria other than this tachycardia for sepsis and appears well overall and reports he feels well overall and feels like his viral URI had last week is gone.  I do not see any obvious bacterial source of infection on examination.  There could be an anxiety component to this as well but seems less likely as patient is calm and jovial and does not seem to be in distress.  Does not fit any specific toxidrome so thing ingestion is unlikely.    -IV access   Cardiac monitor   -EKG   Serial cardiac enzymes   -BNP   -CBC, CMP, magnesium   -TSH and free T4   -1 L normal saline IV fluid bolus   -CTA of the chest  -reassess          ED COURSE, UPDATES, and DISPOSITION:     ED Course as of 05/20/25 1600   Tue May 20, 2025   1434 CBC: Benign   CMP: Creatinine 1.29 which appears to be at the patient's baseline  Magnesium: Normal at 1.8     EKG:  Time is 1:29 p.m..  Rate is 117 beats per minute.  Sinus tachycardia.  There is some artifact at baseline within these limitations I do not see any obvious ST or T-wave abnormalities to suggest acute ischemia.  No STEMI.  I had the nurse staff run a 32nd strip and it appears to be the same regular narrow complex tachycardia with QRS is proceeding all visible P waves.    Studies:  BNP, Trop, TSH, free T4, CTA of the chest [ZH]   1518 Pertinent: Undetectable   BNP normal at 115   .  Free T4 is normal at 1.24    CTA of the chest does not show any pulmonary embolus or any  other abnormality       Uptake:   -after receiving IV fluids the patient's heart rate is now down to 90 and appears to be in sinus rhythm as well.    -the entire workup today is really benign for any cause of his tachycardia.    -I reassessed the patient and he is resting comfortably.  We talked more about his history.  He reports that about 3 weeks ago he had a few days at work where he felt lightheaded and dizzy and some palpitations and also had pretty significant bilateral lower extremity pitting edema of really unclear etiology.  He showed me pictures and it looked like he had 3+ pitting edema to the knees.  He rested for a few days and elevated his legs and took some time off work and it all seemed to resolve.  He spoke with his primary care provider at that time and that is when she has scheduled him to see Cardiology which he saw for the 1st time yesterday.  I am not exactly sure but it is theoretically possible that he was having an episode of AFib with RVR and getting some pulmonary edema and lower extremity edema from this and when he rested and when back in a sinus rhythm this all resolved.  -I think it would be very beneficial for him to wear an outpatient cardiac monitor of some type.  He is already scheduled for a follow up appointment with cardiology approximately 2 weeks from now. I encouraged him to speak with them about wearing a event monitor or Holter monitor of some type at home as he might be having intermittent arrhythmias.    -I gave him a work note for a week but let him know that if he felt better he could go back to work before that time.  I did encourage him to quickly sit down and rest if he develops any dizziness or palpitations again especially while at work if he is not feeling better he should get evaluated.    -Discharge Home    -Return to ED prn [ZH]      ED Course User Index  [ZH] Shyam Santo MD                                 In the ED, the patient  received:    Medications   sodium chloride 0.9% bolus 1,000 mL 1,000 mL (0 mLs Intravenous Stopped 5/20/25 1454)   iohexoL (OMNIPAQUE 350) injection 100 mL (84 mLs Intravenous Given 5/20/25 1439)                                                     Shyam Santo MD  05/20/25 4124       Shyam Santo MD  05/20/25 1326         [1] No current facility-administered medications for this encounter.    Current Outpatient Medications:     atorvastatin (LIPITOR) 40 MG tablet, Take 1 tablet (40 mg total) by mouth every evening., Disp: 90 tablet, Rfl: 3    cariprazine (VRAYLAR) 1.5 mg Cap, Take 1 capsule (1.5 mg total) by mouth once daily., Disp: 30 capsule, Rfl: 3    cefdinir (OMNICEF) 300 MG capsule, Take 1 capsule (300 mg total) by mouth 2 (two) times daily. for 10 days, Disp: 20 capsule, Rfl: 0    DULoxetine (CYMBALTA) 30 MG capsule, Take 1 capsule (30 mg total) by mouth once daily. Take with 60mg for total daily dose of 90mg, Disp: 30 capsule, Rfl: 11    DULoxetine (CYMBALTA) 60 MG capsule, Take 1 capsule (60 mg total) by mouth once daily., Disp: 90 capsule, Rfl: 3    meclizine (ANTIVERT) 12.5 mg tablet, Take 1 tablet (12.5 mg total) by mouth every 8 (eight) hours as needed for Dizziness or Nausea., Disp: 20 tablet, Rfl: 0    naproxen (NAPROSYN) 500 MG tablet, TAKE 1 TABLET BY MOUTH TWICE A DAY WITH FOOD, Disp: 60 tablet, Rfl: 0    pantoprazole (PROTONIX) 20 MG tablet, TAKE ONE TABLET BY MOUTH EVERY MORNING EMPTY STOMACH, Disp: 90 tablet, Rfl: 0    propranoloL (INDERAL LA) 60 MG 24 hr capsule, Take 1 capsule (60 mg total) by mouth once daily., Disp: 30 capsule, Rfl: 11    rizatriptan (MAXALT) 5 MG tablet, Take 1 tablet (5 mg total) by mouth daily as needed for Migraine. May repeat dose after 2 hr if necessary, Disp: 12 tablet, Rfl: 1       Shyam Santo MD  05/20/25 1600

## 2025-05-20 NOTE — Clinical Note
"Isaac"Arturo Rodriguez was seen and treated in our emergency department on 5/20/2025.  He may return to work on 05/27/2025.      Please excuse the patient from school/work due to illness/injury.  They may return to school/work on 5/27/2025 or sooner if they are feeling better.  Thank you.       If you have any questions or concerns, please don't hesitate to call.      Shyam Santo MD"

## 2025-05-21 LAB
OHS QRS DURATION: 82 MS
OHS QTC CALCULATION: 460 MS

## 2025-05-22 ENCOUNTER — TELEPHONE (OUTPATIENT)
Dept: FAMILY MEDICINE | Facility: CLINIC | Age: 60
End: 2025-05-22
Payer: COMMERCIAL

## 2025-05-22 DIAGNOSIS — I10 ESSENTIAL HYPERTENSION: Chronic | ICD-10-CM

## 2025-05-22 DIAGNOSIS — R51.9 NONINTRACTABLE HEADACHE, UNSPECIFIED CHRONICITY PATTERN, UNSPECIFIED HEADACHE TYPE: ICD-10-CM

## 2025-05-22 RX ORDER — PROPRANOLOL HYDROCHLORIDE 60 MG/1
60 CAPSULE, EXTENDED RELEASE ORAL DAILY
Qty: 30 CAPSULE | Refills: 4 | Status: SHIPPED | OUTPATIENT
Start: 2025-05-22 | End: 2026-05-22

## 2025-05-22 NOTE — TELEPHONE ENCOUNTER
Pt states that he called his cardiologist and he told him that he needed to call you so that he can get back on his blood pressure medication. He stated that the cardiologist said that once he starts back on it if it needs to be adjusted then he would adjust it. He has been documenting his blood pressures and will upload a copy into his portal.

## 2025-05-23 ENCOUNTER — PATIENT MESSAGE (OUTPATIENT)
Dept: FAMILY MEDICINE | Facility: CLINIC | Age: 60
End: 2025-05-23
Payer: COMMERCIAL

## 2025-05-23 NOTE — TELEPHONE ENCOUNTER
Patient should be taking lisinopril 5mg daily and propranolol 60mg daily. Can increase lisinopril to 20mg daily or change back to olmesartan which he took in the past. What does he prefer?

## 2025-05-23 NOTE — TELEPHONE ENCOUNTER
Patient called his cardiologist again this morning and the cardiologist started him on olmesartan 20mg daily.

## 2025-06-09 ENCOUNTER — OFFICE VISIT (OUTPATIENT)
Dept: BEHAVIORAL HEALTH | Facility: CLINIC | Age: 60
End: 2025-06-09
Payer: COMMERCIAL

## 2025-06-09 VITALS
HEIGHT: 67 IN | OXYGEN SATURATION: 99 % | DIASTOLIC BLOOD PRESSURE: 80 MMHG | HEART RATE: 88 BPM | WEIGHT: 234.81 LBS | SYSTOLIC BLOOD PRESSURE: 128 MMHG | TEMPERATURE: 98 F | BODY MASS INDEX: 36.85 KG/M2

## 2025-06-09 DIAGNOSIS — F41.1 GENERALIZED ANXIETY DISORDER: ICD-10-CM

## 2025-06-09 DIAGNOSIS — F33.1 MODERATE EPISODE OF RECURRENT MAJOR DEPRESSIVE DISORDER: Primary | ICD-10-CM

## 2025-06-09 DIAGNOSIS — F43.21 GRIEF: ICD-10-CM

## 2025-06-09 LAB
AMP AMPHETAMINE 1000 NM/ML POC: NEGATIVE
BAR BARBITURATES 300 NG/ML POC: NEGATIVE
BUP BUPRENORPHINE 10 NG/ML POC: NEGATIVE
BZO BENZODIAZEPINES 300 NG/ML POC: NEGATIVE
COC COCAINE 300 NG/ML POC: NEGATIVE
CREATININE (CR) POC: NORMAL
CTP QC/QA: YES
MET METHAMPHETAMINE 1000 NG/ML POC: NEGATIVE
MOP/OPI300 MORPHINE 300 NG/ML POC: NEGATIVE
MTD METHADONE 300 NG/ML POC: NEGATIVE
OXIDANT (OX) POC: NORMAL
OXY OXYCODONE 100 NG/ML POC: NEGATIVE
SPECIFIC GRAVITY (SG) POC: NORMAL
TEMPERATURE (°F) POC: 96
THC MARIJUANA 50 NG/ML POC: NEGATIVE

## 2025-06-09 PROCEDURE — 80305 DRUG TEST PRSMV DIR OPT OBS: CPT | Mod: QW,,, | Performed by: NURSE PRACTITIONER

## 2025-06-09 PROCEDURE — 99204 OFFICE O/P NEW MOD 45 MIN: CPT | Mod: ,,, | Performed by: NURSE PRACTITIONER

## 2025-06-09 PROCEDURE — 1159F MED LIST DOCD IN RCRD: CPT | Mod: CPTII,,, | Performed by: NURSE PRACTITIONER

## 2025-06-09 PROCEDURE — 1160F RVW MEDS BY RX/DR IN RCRD: CPT | Mod: CPTII,,, | Performed by: NURSE PRACTITIONER

## 2025-06-09 PROCEDURE — 4010F ACE/ARB THERAPY RXD/TAKEN: CPT | Mod: CPTII,,, | Performed by: NURSE PRACTITIONER

## 2025-06-09 PROCEDURE — 3079F DIAST BP 80-89 MM HG: CPT | Mod: CPTII,,, | Performed by: NURSE PRACTITIONER

## 2025-06-09 PROCEDURE — 3044F HG A1C LEVEL LT 7.0%: CPT | Mod: CPTII,,, | Performed by: NURSE PRACTITIONER

## 2025-06-09 PROCEDURE — 3074F SYST BP LT 130 MM HG: CPT | Mod: CPTII,,, | Performed by: NURSE PRACTITIONER

## 2025-06-09 PROCEDURE — 3008F BODY MASS INDEX DOCD: CPT | Mod: CPTII,,, | Performed by: NURSE PRACTITIONER

## 2025-06-09 RX ORDER — CARIPRAZINE 1.5 MG/1
1.5 CAPSULE, GELATIN COATED ORAL DAILY
Qty: 30 CAPSULE | Refills: 0 | Status: SHIPPED | OUTPATIENT
Start: 2025-06-09

## 2025-06-09 RX ORDER — DULOXETIN HYDROCHLORIDE 60 MG/1
60 CAPSULE, DELAYED RELEASE ORAL DAILY
Qty: 90 CAPSULE | Refills: 0 | Status: SHIPPED | OUTPATIENT
Start: 2025-06-09 | End: 2026-06-09

## 2025-06-09 RX ORDER — DULOXETIN HYDROCHLORIDE 30 MG/1
30 CAPSULE, DELAYED RELEASE ORAL DAILY
Qty: 30 CAPSULE | Refills: 0 | Status: SHIPPED | OUTPATIENT
Start: 2025-06-09 | End: 2026-06-09

## 2025-06-09 NOTE — PROGRESS NOTES
PSYCHIATRIC INITIAL VISIT NOTE    Chief Complaint   Patient presents with    [facility]         History of Present Illness  59 y.o. year old White male with hx of depression and anxiety seen today for initial psychiatric evaluation and medication management.  Patient reports original diagnoses in 2018.  Recent exacerbation after his wife passed away on Shahriar day of 2024.  She was diagnosed with cancer in 2018.  Since that time his symptoms have waxed and waned.  Recently has been experiencing depressed mood, anhedonia, sleep disturbances, fatigue, feelings of guilt and shame, poor focus and concentration, anxiety, excessive worry, general worry, difficulty relaxing, irritability, and catastrophizing.  He also had to quit his job last week due to chronic hip pain in his awaiting disability payments.  He is mildly circumstantial and expansive, but does not appear manic and denies any history of any manic episodes.  Reports significant financial stress as well as a decreased sex drive for the past 2 weeks or so.  Also feels that he is sleeping too much, averages at least 8 hours per night but this is not restful sleep.  He then finds himself napping during the day and not feeling rested either.  He does see Josue Grewal for counseling.  Denies any history of psychiatric admissions, no history of suicide attempts or nonsuicidal self-harm behaviors.  He denies any history of SI/HI.  No history of psychosis and/or roberto/hypomania. Patient denies SI/HI. Denies hallucinations and does not appear to be responding to internal stimuli or be internally preoccupied. No manic symptoms noted. Tolerating SGA therapy without serious side effects. No NMS s/s. No EPS or TD symptoms noted. AIMS=0.    Patient was raised by his biological parents along with his 3 siblings.  Met all developmental milestones appropriately.  Denies any history of trauma.  Obtained a bachelor's degree in art.  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  Currently lives with an aunt.  Feels safe at home and that has a good support system at this time.  Lost his significant other of 26 years last Shahriar.  He has 7 children, 3 of whom are Miners.  They are currently staying with other family members.  Denies any history of seizure activity or head injuries.  Hobbies include photography, art, air soft guns, and sword fighting palpitations.  He does endorse spiritual affiliation.  Denies any history of legal issues or  service.  Denies any tobacco, alcohol, or drug use.  No history of addiction.    Patient denies any family psychiatric history.  No reported family history of suicides.  Patient has been doing fair on Vraylar, just resumed his Cymbalta last week.  Has taken Lexapro in the past but she did not find effective.  Has also taken Luvox in [place] but he does not remember his response to those.      Medical History    Past Medical History    Diagnosis Date Comments   HTN (hypertension) [I10]     Depression [F32.A]     Hyperlipidemia [E78.5]     Kidney stones [N20.0]       Surgical History    Past Surgical History     Laterality Date Comments   Knee surgery [PPO455] Right     Shoulder surgery [KUJ917] Left     Cholecystectomy [SHX55]         Family History     Relation Status Problems (Age of Onset) - (Comment)        Mother  Lung cancer   Father  Cancer     Social History    Tobacco History    Smoking Status  Never   Smokeless Tobacco Use  Never   Alcohol History    Alcohol Use Status  Not Currently   Drug Use    Drug Use Status  Never   Sexual Activity    Sexually Active  Yes Partners  Female     Additional Pertinent History    Questions Responses   Lives with family   Place in Birth Order 3rd   Lives in home   Number of Siblings 3   Raised by biological parents   Legal Involvement none   Childhood Trauma uneventful   Criminal History of none   Financial Status unemployed   Highest Level of Education  "Bachelor's Degree   Does patient have access to a firearm? Yes    Service No   Primary Leisure Activity time with family   Spirituality other     Current Gender Identity    Questions Responses   Patient's Gender Identity: Male   Patient's sex assigned at birth: Male         Objective:     Vitals:  Vitals:    06/09/25 1400   BP: 128/80   BP Location: Right arm   Patient Position: Sitting   Pulse: 88   Temp: 98.1 °F (36.7 °C)   TempSrc: Temporal   SpO2: 99%   Weight: 106.5 kg (234 lb 12.6 oz)   Height: 5' 7.01" (1.702 m)       Wt Readings from Last 3 Encounters:   06/09/25 1400 106.5 kg (234 lb 12.6 oz)   06/02/25 1013 105.2 kg (232 lb)   05/20/25 1332 106.1 kg (234 lb)         Medication:  Current Medications[1]       Significant Labs: - UDS obtained in clinic negative for illicit substances    Significant Imaging: - none at this time    Physical Exam     See HPI    Review of Systems     Mental Status Exam:  Presentation:  - Appearance: 59 y.o. year old White male, appears stated age, appears Casually dressed and Well groomed  - Motility: Erect when standing, Steady gait, No EPS or Tremors, No psychomotor agitation or retardation appreciated  - Behavior: anxious, cooperative, doesn't maintain eye contact, sullen  Speech:  - Character/Organization: spontaneous, fluent, normal volume, normal rate, normal rhythm  Emotional State:  - Mood: "depressed"   - Affect: congruent and depressed  Thought:  - Process: logical, linear, organized , goal-directed  - Preoccupations: guilt, family  - Delusions: no persecutory, paranoid, or grandiose delusions appreciated  - Perception: denies AVH, not actively responding to internal stimuli  - SI/HI: denies/denies  Sensorium & Intellect:  - Sensorium: AAOx4  - Memory: intact to recent and remote events  - Attention/Concentration: good to fair  - Insight/Judgement: good to fair and good/good    Gait: normal swing and stance  MSK:no rigidity appreciated    All other systems " without acute issues unless noted in HPI      Assessment/Plan      ICD-10-CM ICD-9-CM    1. Moderate episode of recurrent major depressive disorder  F33.1 296.32 DULoxetine (CYMBALTA) 30 MG capsule      2. Generalized anxiety disorder  F41.1 300.02 POCT Urine Drug Screen (With BUP)      DULoxetine (CYMBALTA) 60 MG capsule      3. Grief  F43.21 309.0          Continue current medications without change.  Allow time for Cymbalta to reach peak efficacy at current dose    Continue other medications without change    Continue counseling sessions with Josue Grewal    Potential side effects and risks vs benefits of current treatment plan reviewed with patient. Applicable black box warnings reviewed. Encouraged patient not to alter dosages or abruptly discontinue medications without contacting prescriber first, due to risk of worsening symptoms and decompensation of mental status. Warned of risks associated with herbal remedies and supplements while taking psychotropic medications and of the need to consult prescriber prior to adding any of these to current regimen. Patient should abstain from abuse of alcohol, prescription medications, and illicit drugs. Reviewed when to contact clinic and/or seek emergent care, such as but not limited to, onset/worsening SI/HI, hallucinations, delusions, manic symptoms. Pt verbalized understanding and agreement of these warnings/recommendations and verbally consented to treatment plan.      Follow up in about 4 weeks (around 7/7/2025) for Medication Management.        Abraham Qureshi, MARCYP         [1]   Current Outpatient Medications:     atorvastatin (LIPITOR) 40 MG tablet, Take 1 tablet (40 mg total) by mouth every evening., Disp: 90 tablet, Rfl: 3    meclizine (ANTIVERT) 12.5 mg tablet, Take 1 tablet (12.5 mg total) by mouth every 8 (eight) hours as needed for Dizziness or Nausea., Disp: 20 tablet, Rfl: 0    naproxen (NAPROSYN) 500 MG tablet, TAKE 1 TABLET BY MOUTH TWICE A DAY  WITH FOOD, Disp: 60 tablet, Rfl: 0    pantoprazole (PROTONIX) 20 MG tablet, TAKE ONE TABLET BY MOUTH EVERY MORNING EMPTY STOMACH, Disp: 90 tablet, Rfl: 0    propranoloL (INDERAL LA) 60 MG 24 hr capsule, Take 1 capsule (60 mg total) by mouth once daily., Disp: 30 capsule, Rfl: 4    rizatriptan (MAXALT) 5 MG tablet, Take 1 tablet (5 mg total) by mouth daily as needed for Migraine. May repeat dose after 2 hr if necessary, Disp: 12 tablet, Rfl: 1    cariprazine (VRAYLAR) 1.5 mg Cap, Take 1 capsule (1.5 mg total) by mouth once daily., Disp: 30 capsule, Rfl: 0    DULoxetine (CYMBALTA) 30 MG capsule, Take 1 capsule (30 mg total) by mouth once daily. Take with 60mg for total daily dose of 90mg, Disp: 30 capsule, Rfl: 0    DULoxetine (CYMBALTA) 60 MG capsule, Take 1 capsule (60 mg total) by mouth once daily. Take with 30mg cap for total daily dose of 90mg, Disp: 90 capsule, Rfl: 0

## 2025-06-13 DIAGNOSIS — M25.551 RIGHT HIP PAIN: Primary | ICD-10-CM

## 2025-06-19 ENCOUNTER — HOSPITAL ENCOUNTER (OUTPATIENT)
Dept: RADIOLOGY | Facility: HOSPITAL | Age: 60
Discharge: HOME | End: 2025-06-19
Attending: SPECIALIST
Payer: COMMERCIAL

## 2025-06-19 DIAGNOSIS — M25.551 RIGHT HIP PAIN: ICD-10-CM

## 2025-06-19 PROCEDURE — 73721 MRI JNT OF LWR EXTRE W/O DYE: CPT | Mod: TC,LT

## 2025-06-19 PROCEDURE — 73721 MRI JNT OF LWR EXTRE W/O DYE: CPT | Mod: TC,RT

## 2025-06-20 ENCOUNTER — RESULTS FOLLOW-UP (OUTPATIENT)
Dept: FAMILY MEDICINE | Facility: CLINIC | Age: 60
End: 2025-06-20

## 2025-06-20 DIAGNOSIS — M87.059 AVASCULAR NECROSIS OF BONE OF HIP, UNSPECIFIED LATERALITY: Primary | ICD-10-CM

## 2025-07-09 DIAGNOSIS — G44.209 ACUTE NON INTRACTABLE TENSION-TYPE HEADACHE: ICD-10-CM

## 2025-07-09 RX ORDER — RIZATRIPTAN BENZOATE 5 MG/1
5 TABLET ORAL DAILY PRN
Qty: 12 TABLET | Refills: 1 | Status: SHIPPED | OUTPATIENT
Start: 2025-07-09 | End: 2025-08-08

## 2025-07-10 ENCOUNTER — OFFICE VISIT (OUTPATIENT)
Dept: BEHAVIORAL HEALTH | Facility: CLINIC | Age: 60
End: 2025-07-10
Payer: MEDICAID

## 2025-07-10 VITALS
TEMPERATURE: 99 F | WEIGHT: 241.38 LBS | OXYGEN SATURATION: 97 % | BODY MASS INDEX: 37.89 KG/M2 | DIASTOLIC BLOOD PRESSURE: 78 MMHG | HEART RATE: 62 BPM | SYSTOLIC BLOOD PRESSURE: 128 MMHG | HEIGHT: 67 IN

## 2025-07-10 DIAGNOSIS — F41.1 GENERALIZED ANXIETY DISORDER: ICD-10-CM

## 2025-07-10 DIAGNOSIS — F33.1 MODERATE EPISODE OF RECURRENT MAJOR DEPRESSIVE DISORDER: Primary | ICD-10-CM

## 2025-07-10 DIAGNOSIS — F43.21 GRIEF: ICD-10-CM

## 2025-07-10 PROCEDURE — 3044F HG A1C LEVEL LT 7.0%: CPT | Mod: CPTII,,, | Performed by: NURSE PRACTITIONER

## 2025-07-10 PROCEDURE — 4010F ACE/ARB THERAPY RXD/TAKEN: CPT | Mod: CPTII,,, | Performed by: NURSE PRACTITIONER

## 2025-07-10 PROCEDURE — 1160F RVW MEDS BY RX/DR IN RCRD: CPT | Mod: CPTII,,, | Performed by: NURSE PRACTITIONER

## 2025-07-10 PROCEDURE — 99214 OFFICE O/P EST MOD 30 MIN: CPT | Mod: ,,, | Performed by: NURSE PRACTITIONER

## 2025-07-10 PROCEDURE — 3074F SYST BP LT 130 MM HG: CPT | Mod: CPTII,,, | Performed by: NURSE PRACTITIONER

## 2025-07-10 PROCEDURE — 3008F BODY MASS INDEX DOCD: CPT | Mod: CPTII,,, | Performed by: NURSE PRACTITIONER

## 2025-07-10 PROCEDURE — 3078F DIAST BP <80 MM HG: CPT | Mod: CPTII,,, | Performed by: NURSE PRACTITIONER

## 2025-07-10 PROCEDURE — 1159F MED LIST DOCD IN RCRD: CPT | Mod: CPTII,,, | Performed by: NURSE PRACTITIONER

## 2025-07-10 RX ORDER — DULOXETIN HYDROCHLORIDE 30 MG/1
30 CAPSULE, DELAYED RELEASE ORAL DAILY
Qty: 90 CAPSULE | Refills: 0 | Status: SHIPPED | OUTPATIENT
Start: 2025-07-10 | End: 2026-07-10

## 2025-07-10 RX ORDER — DULOXETIN HYDROCHLORIDE 60 MG/1
60 CAPSULE, DELAYED RELEASE ORAL DAILY
Qty: 90 CAPSULE | Refills: 0 | Status: SHIPPED | OUTPATIENT
Start: 2025-07-10 | End: 2026-07-10

## 2025-07-10 NOTE — PROGRESS NOTES
"PSYCHIATRIC FOLLOW-UP VISIT NOTE    Chief Complaint   Patient presents with    Medication Management     Medication management         History of Present Illness  59 y.o. year old White male with hx of MDD, ifeanyi, and grief seen today for follow-up appointment and medication management.  Patient denies any recent depression and reports improvement in his anxiety.  Feels he is worrying less in his less easily overwhelmed.  He does have some financial stress, but hopes to be returning to work soon.  Feels he has been coping with his grief better lately as well.  Only had 1 tearful episode in the past 2 weeks.  He plans to resume counseling sessions soon now that he has received his new insurance coverage.  He is sleeping well at night, but sometimes sleeps during the day too.  Denies any side effects from current medication regimen. Patient denies SI/HI. Denies hallucinations and does not appear to be responding to internal stimuli or be internally preoccupied. No manic symptoms noted. Tolerating SGA therapy without serious side effects. No NMS s/s. No EPS or TD symptoms noted. AIMS=0.        Objective:     Vitals:  Vitals:    07/10/25 1320   BP: 128/78   BP Location: Right arm   Patient Position: Sitting   Pulse: 62   Temp: 99 °F (37.2 °C)   TempSrc: Temporal   SpO2: 97%   Weight: 109.5 kg (241 lb 6.5 oz)   Height: 5' 7.01" (1.702 m)       Wt Readings from Last 3 Encounters:   07/10/25 1320 109.5 kg (241 lb 6.5 oz)   06/09/25 1400 106.5 kg (234 lb 12.6 oz)   06/02/25 1013 105.2 kg (232 lb)         Medication:  Current Medications[1]       Significant Labs: - none at this time    Significant Imaging: - none at this time    Physical Exam     See HPI    Review of Systems     Mental Status Exam:  Presentation:  - Appearance: 59 y.o. year old White male, appears stated age, appears Casually dressed and Well groomed  - Motility: Erect when standing, Steady gait, No EPS or Tremors, No psychomotor agitation or retardation " "appreciated  - Behavior: calm, cooperative, good eye contact  Speech:  - Character/Organization: spontaneous, fluent, normal volume, normal rate, normal rhythm  Emotional State:  - Mood: "more at ease"   - Affect: congruent and appropriate  Thought:  - Process: logical, linear, organized , goal-directed  - Preoccupations: no ruminations, rituals, or phobias appreciated  - Delusions: no persecutory, paranoid, or grandiose delusions appreciated  - Perception: denies AVH, not actively responding to internal stimuli  - SI/HI: denies/denies  Sensorium & Intellect:  - Sensorium: AAOx4  - Memory: intact to recent and remote events  - Attention/Concentration: good/good  - Insight/Judgement: good/good    Gait: normal swing and stance  MSK:no rigidity appreciated    All other systems without acute issues unless noted in HPI      Assessment/Plan      ICD-10-CM ICD-9-CM    1. Moderate episode of recurrent major depressive disorder  F33.1 296.32 DULoxetine (CYMBALTA) 30 MG capsule      2. Generalized anxiety disorder  F41.1 300.02 DULoxetine (CYMBALTA) 60 MG capsule      3. Grief  F43.21 309.0          Continue current medications without change    Potential side effects and risks vs benefits of current treatment plan reviewed with patient. Applicable black box warnings reviewed. Encouraged patient not to alter dosages or abruptly discontinue medications without contacting prescriber first, due to risk of worsening symptoms and decompensation of mental status. Warned of risks associated with herbal remedies and supplements while taking psychotropic medications and of the need to consult prescriber prior to adding any of these to current regimen. Patient should abstain from abuse of alcohol, prescription medications, and illicit drugs. Reviewed when to contact clinic and/or seek emergent care, such as but not limited to, onset/worsening SI/HI, hallucinations, delusions, manic symptoms. Pt verbalized understanding and agreement of " "these warnings/recommendations and verbally consented to treatment plan.    Portions of this note may have been created with voice recognition software. Occasional "wrong-word" or "sound-a-like" substitutions may have occurred due to the inherent limitations of voice recognition software. Please, read the note carefully and recognize, using context, where substitutions have occurred.      Follow up in about 2 months (around 9/10/2025) for Medication Management.        Abraham Qureshi, [facility]         [1]   Current Outpatient Medications:     atorvastatin (LIPITOR) 40 MG tablet, Take 1 tablet (40 mg total) by mouth every evening., Disp: 90 tablet, Rfl: 3    meclizine (ANTIVERT) 12.5 mg tablet, Take 1 tablet (12.5 mg total) by mouth every 8 (eight) hours as needed for Dizziness or Nausea., Disp: 20 tablet, Rfl: 0    naproxen (NAPROSYN) 500 MG tablet, TAKE 1 TABLET BY MOUTH TWICE A DAY WITH FOOD, Disp: 60 tablet, Rfl: 0    pantoprazole (PROTONIX) 20 MG tablet, TAKE ONE TABLET BY MOUTH EVERY MORNING EMPTY STOMACH, Disp: 90 tablet, Rfl: 0    propranoloL (INDERAL LA) 60 MG 24 hr capsule, Take 1 capsule (60 mg total) by mouth once daily., Disp: 30 capsule, Rfl: 4    rizatriptan (MAXALT) 5 MG tablet, TAKE 1 TABLET (5 MG TOTAL) BY MOUTH DAILY AS NEEDED FOR MIGRAINE. MAY REPEAT DOSE AFTER 2 HR IF NECESSARY, Disp: 12 tablet, Rfl: 1    cariprazine (VRAYLAR) 1.5 mg Cap, Take 1 capsule (1.5 mg total) by mouth once daily., Disp: 90 capsule, Rfl: 0    DULoxetine (CYMBALTA) 30 MG capsule, Take 1 capsule (30 mg total) by mouth once daily. Take with 60mg for total daily dose of 90mg, Disp: 90 capsule, Rfl: 0    DULoxetine (CYMBALTA) 60 MG capsule, Take 1 capsule (60 mg total) by mouth once daily. Take with 30mg cap for total daily dose of 90mg, Disp: 90 capsule, Rfl: 0    "

## 2025-08-14 ENCOUNTER — TELEPHONE (OUTPATIENT)
Dept: FAMILY MEDICINE | Facility: CLINIC | Age: 60
End: 2025-08-14
Payer: MEDICAID

## 2025-08-15 ENCOUNTER — PATIENT MESSAGE (OUTPATIENT)
Dept: FAMILY MEDICINE | Facility: CLINIC | Age: 60
End: 2025-08-15
Payer: MEDICAID

## 2025-08-18 ENCOUNTER — TELEPHONE (OUTPATIENT)
Dept: FAMILY MEDICINE | Facility: CLINIC | Age: 60
End: 2025-08-18
Payer: MEDICAID

## 2025-08-18 DIAGNOSIS — F33.1 MODERATE EPISODE OF RECURRENT MAJOR DEPRESSIVE DISORDER: Primary | ICD-10-CM

## 2025-08-18 DIAGNOSIS — E78.49 OTHER HYPERLIPIDEMIA: ICD-10-CM

## 2025-08-18 PROCEDURE — 84443 ASSAY THYROID STIM HORMONE: CPT | Performed by: NURSE PRACTITIONER

## 2025-08-18 PROCEDURE — 80053 COMPREHEN METABOLIC PANEL: CPT | Performed by: NURSE PRACTITIONER

## 2025-08-18 PROCEDURE — 83036 HEMOGLOBIN GLYCOSYLATED A1C: CPT | Performed by: NURSE PRACTITIONER

## 2025-08-18 PROCEDURE — 80061 LIPID PANEL: CPT | Performed by: NURSE PRACTITIONER

## 2025-08-18 PROCEDURE — 85025 COMPLETE CBC W/AUTO DIFF WBC: CPT | Performed by: NURSE PRACTITIONER

## 2025-08-18 RX ORDER — CARIPRAZINE 1.5 MG/1
1.5 CAPSULE, GELATIN COATED ORAL
Qty: 90 CAPSULE | Refills: 0 | Status: SHIPPED | OUTPATIENT
Start: 2025-08-18

## 2025-08-18 RX ORDER — ATORVASTATIN CALCIUM 40 MG/1
40 TABLET, FILM COATED ORAL NIGHTLY
Qty: 90 TABLET | Refills: 3 | Status: SHIPPED | OUTPATIENT
Start: 2025-08-18

## 2025-08-19 ENCOUNTER — RESULTS FOLLOW-UP (OUTPATIENT)
Dept: FAMILY MEDICINE | Facility: CLINIC | Age: 60
End: 2025-08-19
Payer: MEDICAID

## 2025-08-25 ENCOUNTER — OFFICE VISIT (OUTPATIENT)
Dept: FAMILY MEDICINE | Facility: CLINIC | Age: 60
End: 2025-08-25
Payer: MEDICAID

## 2025-08-25 VITALS
HEIGHT: 67 IN | DIASTOLIC BLOOD PRESSURE: 72 MMHG | OXYGEN SATURATION: 98 % | SYSTOLIC BLOOD PRESSURE: 110 MMHG | TEMPERATURE: 97 F | HEART RATE: 68 BPM | WEIGHT: 243 LBS | BODY MASS INDEX: 38.14 KG/M2

## 2025-08-25 DIAGNOSIS — M87.052 AVASCULAR NECROSIS OF BONE OF LEFT HIP: ICD-10-CM

## 2025-08-25 DIAGNOSIS — E66.812 CLASS 2 SEVERE OBESITY DUE TO EXCESS CALORIES WITH SERIOUS COMORBIDITY AND BODY MASS INDEX (BMI) OF 38.0 TO 38.9 IN ADULT: ICD-10-CM

## 2025-08-25 DIAGNOSIS — R51.9 NONINTRACTABLE EPISODIC HEADACHE, UNSPECIFIED HEADACHE TYPE: ICD-10-CM

## 2025-08-25 DIAGNOSIS — F32.9 TREATMENT-RESISTANT DEPRESSION: ICD-10-CM

## 2025-08-25 DIAGNOSIS — N18.2 CKD (CHRONIC KIDNEY DISEASE) STAGE 2, GFR 60-89 ML/MIN: ICD-10-CM

## 2025-08-25 DIAGNOSIS — F41.9 ANXIETY: ICD-10-CM

## 2025-08-25 DIAGNOSIS — Z13.31 POSITIVE DEPRESSION SCREENING: ICD-10-CM

## 2025-08-25 DIAGNOSIS — M16.0 PRIMARY OSTEOARTHRITIS OF BOTH HIPS: ICD-10-CM

## 2025-08-25 DIAGNOSIS — I10 ESSENTIAL HYPERTENSION: Chronic | ICD-10-CM

## 2025-08-25 DIAGNOSIS — E66.01 CLASS 2 SEVERE OBESITY DUE TO EXCESS CALORIES WITH SERIOUS COMORBIDITY AND BODY MASS INDEX (BMI) OF 38.0 TO 38.9 IN ADULT: ICD-10-CM

## 2025-08-25 DIAGNOSIS — G44.209 ACUTE NON INTRACTABLE TENSION-TYPE HEADACHE: ICD-10-CM

## 2025-08-25 DIAGNOSIS — E78.49 OTHER HYPERLIPIDEMIA: ICD-10-CM

## 2025-08-25 DIAGNOSIS — Z00.01 ABNORMAL WELLNESS EXAM: Primary | ICD-10-CM

## 2025-08-25 DIAGNOSIS — R73.03 PREDIABETES: Chronic | ICD-10-CM

## 2025-08-25 PROCEDURE — 1159F MED LIST DOCD IN RCRD: CPT | Mod: CPTII,,, | Performed by: NURSE PRACTITIONER

## 2025-08-25 PROCEDURE — 4010F ACE/ARB THERAPY RXD/TAKEN: CPT | Mod: CPTII,,, | Performed by: NURSE PRACTITIONER

## 2025-08-25 PROCEDURE — 3008F BODY MASS INDEX DOCD: CPT | Mod: CPTII,,, | Performed by: NURSE PRACTITIONER

## 2025-08-25 PROCEDURE — 3078F DIAST BP <80 MM HG: CPT | Mod: CPTII,,, | Performed by: NURSE PRACTITIONER

## 2025-08-25 PROCEDURE — 3044F HG A1C LEVEL LT 7.0%: CPT | Mod: CPTII,,, | Performed by: NURSE PRACTITIONER

## 2025-08-25 PROCEDURE — 99396 PREV VISIT EST AGE 40-64: CPT | Mod: ,,, | Performed by: NURSE PRACTITIONER

## 2025-08-25 PROCEDURE — 1160F RVW MEDS BY RX/DR IN RCRD: CPT | Mod: CPTII,,, | Performed by: NURSE PRACTITIONER

## 2025-08-25 PROCEDURE — 3074F SYST BP LT 130 MM HG: CPT | Mod: CPTII,,, | Performed by: NURSE PRACTITIONER

## 2025-08-25 RX ORDER — OLMESARTAN MEDOXOMIL 20 MG/1
20 TABLET ORAL DAILY
COMMUNITY
Start: 2025-08-18

## 2025-08-25 RX ORDER — ATORVASTATIN CALCIUM 40 MG/1
40 TABLET, FILM COATED ORAL NIGHTLY
Qty: 90 TABLET | Refills: 3 | Status: SHIPPED | OUTPATIENT
Start: 2025-08-25

## 2025-08-25 RX ORDER — PROPRANOLOL HYDROCHLORIDE 60 MG/1
60 CAPSULE, EXTENDED RELEASE ORAL DAILY
Qty: 90 CAPSULE | Refills: 3 | Status: SHIPPED | OUTPATIENT
Start: 2025-08-25 | End: 2026-08-25

## 2025-08-25 RX ORDER — RIZATRIPTAN BENZOATE 5 MG/1
5 TABLET ORAL DAILY PRN
Qty: 12 TABLET | Refills: 1 | Status: SHIPPED | OUTPATIENT
Start: 2025-08-25 | End: 2025-09-24

## 2025-08-26 ENCOUNTER — PATIENT MESSAGE (OUTPATIENT)
Dept: FAMILY MEDICINE | Facility: CLINIC | Age: 60
End: 2025-08-26
Payer: MEDICAID